# Patient Record
Sex: FEMALE | Race: WHITE | NOT HISPANIC OR LATINO | Employment: FULL TIME | RURAL
[De-identification: names, ages, dates, MRNs, and addresses within clinical notes are randomized per-mention and may not be internally consistent; named-entity substitution may affect disease eponyms.]

---

## 2019-11-01 ENCOUNTER — OFFICE VISIT (OUTPATIENT)
Dept: FAMILY MEDICINE CLINIC | Facility: CLINIC | Age: 24
End: 2019-11-01

## 2019-11-01 VITALS
HEART RATE: 90 BPM | DIASTOLIC BLOOD PRESSURE: 74 MMHG | HEIGHT: 66 IN | WEIGHT: 248.8 LBS | TEMPERATURE: 98.6 F | BODY MASS INDEX: 39.98 KG/M2 | RESPIRATION RATE: 16 BRPM | SYSTOLIC BLOOD PRESSURE: 112 MMHG | OXYGEN SATURATION: 98 %

## 2019-11-01 DIAGNOSIS — Z30.011 ENCOUNTER FOR INITIAL PRESCRIPTION OF CONTRACEPTIVE PILLS: ICD-10-CM

## 2019-11-01 PROCEDURE — 99203 OFFICE O/P NEW LOW 30 MIN: CPT | Performed by: FAMILY MEDICINE

## 2019-11-01 RX ORDER — NORGESTIMATE AND ETHINYL ESTRADIOL 0.25-0.035
1 KIT ORAL DAILY
Qty: 28 TABLET | Refills: 5 | Status: SHIPPED | OUTPATIENT
Start: 2019-11-01 | End: 2020-03-23 | Stop reason: SDUPTHER

## 2019-11-01 RX ORDER — CITALOPRAM 20 MG/1
20 TABLET ORAL DAILY
Qty: 30 TABLET | Refills: 5 | Status: SHIPPED | OUTPATIENT
Start: 2019-11-01 | End: 2020-02-12

## 2019-11-01 NOTE — ASSESSMENT & PLAN NOTE
Take medication as prescribed/ordered.  Common side effects discussed.  Watch for worsening mood or behavior, change in sleep pattern, appetite changes, weight gain, or suicidal thoughts.  Allow 4-6 weeks for medication to reach maximal effectiveness.  F/U in office in 8 weeks to re-evaluate medication, sooner if needed.

## 2019-11-01 NOTE — PROGRESS NOTES
Chief Complaint   Patient presents with   • Depression   • Contraception       Subjective   Louise Lopez is a 23 y.o. female.     Patient states she's noticed depression symptoms since having her daughter 8 weeks ago. She went back to work 2 weeks post partum because her  lost his job.  She also has a 2 year old at home and just has a lot going on.  Depressive symptoms have put a strain on her marriage.  She denies a history of abuse.  She has no prior history of depression.  No depression after first pregnancy.  She reports an uneventful pregnancy and delivery.     Patient also wants to discuss contraception.  She was previously on oral contraceptives and is interested in starting back on them.   She has an OB-GYN who will be doing a pap smear in a few weeks.  She is not currently breast feeding.  Menses were regular but heavy (cramping) prior to pregnancy.  She has not been sexually active since delivery.    Past medical history reviewed.  She has not recently seen a PCP.      Depression   Visit Type: initial  Onset of symptoms: 1 to 6 months ago (2 months ago)  Progression since onset: gradually worsening  Patient presents with the following symptoms: depressed mood, excessive worry, insomnia, irritability and nervousness/anxiety.  Patient is not experiencing: palpitations, shortness of breath and suicidal ideas.  Frequency of symptoms: most days   Severity: moderate   Risk factors: major life event    Contraception   This is a chronic problem. The current episode started more than 1 year ago. The problem occurs constantly. Associated symptoms include fatigue. Pertinent negatives include no abdominal pain, arthralgias, chest pain, chills, coughing, fever, myalgias, nausea, numbness, rash, sore throat, vomiting or weakness.          The following portions of the patient's history were reviewed and updated as appropriate: allergies, current medications, past family history, past medical history, past  social history, past surgical history and problem list.      Active Ambulatory Problems     Diagnosis Date Noted   • GERD (gastroesophageal reflux disease)    • Anemia affecting pregnancy    • Post partum depression 11/01/2019   • Encounter for initial prescription of contraceptive pills 11/01/2019     Resolved Ambulatory Problems     Diagnosis Date Noted   • No Resolved Ambulatory Problems     Past Medical History:   Diagnosis Date   • Anemia affecting pregnancy    • GERD (gastroesophageal reflux disease)    • History of chicken pox        No current outpatient medications on file prior to visit.     No current facility-administered medications on file prior to visit.        History reviewed. No pertinent surgical history.      Family History   Problem Relation Age of Onset   • Heart disease Father    • Hypertension Father    • Kidney disease Father    • Diabetes Father    • Depression Father    • Anxiety disorder Father    • Alcohol abuse Father          Social History     Tobacco Use   • Smoking status: Never Smoker   • Smokeless tobacco: Never Used   Substance Use Topics   • Alcohol use: Not on file       Review of Systems   Constitutional: Positive for fatigue and irritability. Negative for chills and fever.   HENT: Negative for ear pain, sore throat and trouble swallowing.    Eyes: Negative for double vision and visual disturbance.   Respiratory: Negative for cough and shortness of breath.    Cardiovascular: Negative for chest pain, palpitations and leg swelling.   Gastrointestinal: Negative for abdominal pain, blood in stool, constipation, diarrhea, nausea and vomiting.   Endocrine: Negative for polydipsia and polyuria.   Genitourinary: Negative for breast discharge, breast pain, dysuria, menstrual problem and pelvic pain.   Musculoskeletal: Negative for arthralgias and myalgias.   Skin: Negative for rash.   Neurological: Negative for dizziness, tremors, seizures, syncope, weakness, numbness and headache.  "  Psychiatric/Behavioral: Positive for sleep disturbance, depressed mood and stress. Negative for behavioral problems and suicidal ideas. The patient is nervous/anxious and has insomnia.        Objective   Vitals:    11/01/19 1502   BP: 112/74   Pulse: 90   Resp: 16   Temp: 98.6 °F (37 °C)   SpO2: 98%   Weight: 113 kg (248 lb 12.8 oz)   Height: 167.6 cm (66\")     Body mass index is 40.16 kg/m².     Physical Exam   Constitutional: She is oriented to person, place, and time. She appears well-developed and well-nourished.   HENT:   Head: Normocephalic and atraumatic.   Mouth/Throat: Oropharynx is clear and moist.   Eyes: Conjunctivae and EOM are normal. Pupils are equal, round, and reactive to light. No scleral icterus.   Neck: Normal range of motion. Neck supple. No JVD present. No edema present. No thyromegaly present.   Cardiovascular: Normal rate, regular rhythm and normal heart sounds.   Pulmonary/Chest: Effort normal and breath sounds normal.   Abdominal: Soft. Bowel sounds are normal. There is no tenderness. There is no rebound and no guarding.   Musculoskeletal: She exhibits no edema.   Lymphadenopathy:     She has no cervical adenopathy.   Neurological: She is alert and oriented to person, place, and time. No cranial nerve deficit. She exhibits normal muscle tone. Coordination normal.   Skin: Skin is warm. Capillary refill takes less than 2 seconds. No rash noted.   Psychiatric: She has a normal mood and affect. Her behavior is normal. Judgment and thought content normal.     Patient unable to void and provide sample for urine b-HCG.  Unlikely to be pregnant, as no sexual activity since delivery.      Assessment/Plan       Diagnoses and all orders for this visit:    1. Post partum depression (Primary)  Assessment & Plan:  Take medication as prescribed/ordered.  Common side effects discussed.  Watch for worsening mood or behavior, change in sleep pattern, appetite changes, weight gain, or suicidal thoughts.  " Allow 4-6 weeks for medication to reach maximal effectiveness.  F/U in office in 8 weeks to re-evaluate medication, sooner if needed.      Orders:  -     citalopram (CeleXA) 20 MG tablet; Take 1 tablet by mouth Daily.  Dispense: 30 tablet; Refill: 5    2. Encounter for initial prescription of contraceptive pills  Assessment & Plan:  Start OCP.    Orders:  -     norgestimate-ethinyl estradiol (SPRINTEC 28) 0.25-35 MG-MCG per tablet; Take 1 tablet by mouth Daily.  Dispense: 28 tablet; Refill: 5        Return in about 2 months (around 1/1/2020).

## 2019-11-04 ENCOUNTER — TELEPHONE (OUTPATIENT)
Dept: FAMILY MEDICINE CLINIC | Facility: CLINIC | Age: 24
End: 2019-11-04

## 2020-01-03 ENCOUNTER — TELEPHONE (OUTPATIENT)
Dept: FAMILY MEDICINE CLINIC | Facility: CLINIC | Age: 25
End: 2020-01-03

## 2020-02-12 ENCOUNTER — OFFICE VISIT (OUTPATIENT)
Dept: FAMILY MEDICINE CLINIC | Facility: CLINIC | Age: 25
End: 2020-02-12

## 2020-02-12 VITALS
BODY MASS INDEX: 39.63 KG/M2 | DIASTOLIC BLOOD PRESSURE: 64 MMHG | WEIGHT: 246.6 LBS | SYSTOLIC BLOOD PRESSURE: 114 MMHG | TEMPERATURE: 98.7 F | HEIGHT: 66 IN | HEART RATE: 82 BPM | OXYGEN SATURATION: 98 % | RESPIRATION RATE: 16 BRPM

## 2020-02-12 DIAGNOSIS — N30.00 ACUTE CYSTITIS WITHOUT HEMATURIA: Primary | ICD-10-CM

## 2020-02-12 DIAGNOSIS — E66.01 MORBID OBESITY (HCC): ICD-10-CM

## 2020-02-12 LAB
BILIRUB BLD-MCNC: NEGATIVE MG/DL
CLARITY, POC: ABNORMAL
COLOR UR: ABNORMAL
GLUCOSE UR STRIP-MCNC: NEGATIVE MG/DL
KETONES UR QL: NEGATIVE
LEUKOCYTE EST, POC: ABNORMAL
NITRITE UR-MCNC: NEGATIVE MG/ML
PH UR: 5 [PH] (ref 5–8)
PROT UR STRIP-MCNC: ABNORMAL MG/DL
RBC # UR STRIP: ABNORMAL /UL
SP GR UR: 1.02 (ref 1–1.03)
UROBILINOGEN UR QL: NORMAL

## 2020-02-12 PROCEDURE — 99214 OFFICE O/P EST MOD 30 MIN: CPT | Performed by: FAMILY MEDICINE

## 2020-02-12 RX ORDER — ORLISTAT 120 MG/1
120 CAPSULE ORAL
Qty: 90 CAPSULE | Refills: 2 | Status: SHIPPED | OUTPATIENT
Start: 2020-02-12 | End: 2020-02-26

## 2020-02-12 RX ORDER — SULFAMETHOXAZOLE AND TRIMETHOPRIM 800; 160 MG/1; MG/1
1 TABLET ORAL 2 TIMES DAILY
Qty: 14 TABLET | Refills: 0 | Status: SHIPPED | OUTPATIENT
Start: 2020-02-12 | End: 2020-02-19

## 2020-02-12 NOTE — PROGRESS NOTES
Chief Complaint   Patient presents with   • Urinary Tract Infection   • Weight Gain       Subjective   Louise Lopez is a 24 y.o. female.     Urinary Tract Infection    This is a new problem. The current episode started 1 to 4 weeks ago. The problem occurs every urination. The problem has been gradually worsening. The quality of the pain is described as burning. The pain is at a severity of 5/10 (to 10). The pain is mild (to severe). There has been no fever. She is sexually active. There is no history of pyelonephritis. Associated symptoms include frequency and urgency. Pertinent negatives include no chills, flank pain, hematuria, nausea or vomiting. She has tried nothing for the symptoms.      Patient is here to follow-up today on a visit with an urgent care in [place].  She was seen for the symptoms.  She was given medications.  She does not recall which medicine she took.  She states they called her a couple days later needed to change the medicines but she was not able to get these prescriptions.  Her current symptoms are progressively worsening.  She is having a lot of discomfort in her lower pelvis.  She is not having any bowel changes, constipation, diarrhea, or blood in her stools.  There is been no blood in her urine.  She denies any vaginal discharge.  She has no known history of STDs.    She was last seen in this office in November for postpartum depression.  She was prescribed citalopram.  She took this medicine briefly but developed headaches.  She discontinued the medication and did not restart.  She reports her symptoms are better.  She is having less mood fluctuations.  She is not interested in medications at this time.    She also reports that she is trying to diet and exercise without notable weight loss.  She feels she is gaining weight.  Weight difference from her last visit in our office is 2 pounds loss.  She is concerned that her birth control pill may be a factor.  She is considering a  tubal.  She would like to start weight loss medications to help her lose weight.  She is interested in orlistat.    I have reviewed and updated her medications, medical history and problem list during today's office visit.       Past Medical History :  Active Ambulatory Problems     Diagnosis Date Noted   • GERD (gastroesophageal reflux disease)    • Anemia affecting pregnancy    • Encounter for initial prescription of contraceptive pills 11/01/2019     Resolved Ambulatory Problems     Diagnosis Date Noted   • Post partum depression 11/01/2019     Past Medical History:   Diagnosis Date   • History of chicken pox        Medication List:    Current Outpatient Medications:   •  norgestimate-ethinyl estradiol (SPRINTEC 28) 0.25-35 MG-MCG per tablet, Take 1 tablet by mouth Daily., Disp: 28 tablet, Rfl: 5      Social History     Tobacco Use   • Smoking status: Never Smoker   • Smokeless tobacco: Never Used   Substance Use Topics   • Alcohol use: Not on file       Review of Systems   Constitutional: Negative for activity change, appetite change, chills and fever.        See HPI   HENT: Negative for sore throat.    Eyes: Negative for visual disturbance.   Respiratory: Negative for cough, shortness of breath and wheezing.    Cardiovascular: Negative for chest pain and leg swelling.   Gastrointestinal: Negative for abdominal pain, blood in stool, constipation, diarrhea, nausea and vomiting.   Endocrine: Negative for polydipsia.   Genitourinary: Positive for decreased urine volume (Hesitancy), difficulty urinating, dysuria, frequency, pelvic pressure and urgency. Negative for amenorrhea, flank pain, genital sores, hematuria, menstrual problem (Menses regular), urinary incontinence, vaginal bleeding and vaginal discharge.   Musculoskeletal: Negative for myalgias.   Skin: Negative for rash.   Neurological: Negative for dizziness, weakness and headache (Headaches with citalopram now resolved.).   Psychiatric/Behavioral: Negative  "for suicidal ideas, depressed mood and stress. The patient is not nervous/anxious.        I have reviewed and confirmed the accuracy of the ROS as documented by the MA/LPN/RN Inocencia Salas MD      Objective   Vitals:    02/12/20 1304   BP: 114/64   BP Location: Left arm   Patient Position: Sitting   Cuff Size: Large Adult   Pulse: 82   Resp: 16   Temp: 98.7 °F (37.1 °C)   TempSrc: Oral   SpO2: 98%   Weight: 112 kg (246 lb 9.6 oz)   Height: 167.6 cm (66\")     Body mass index is 39.8 kg/m².    Physical Exam   Constitutional: She appears well-developed and well-nourished.   HENT:   Head: Normocephalic and atraumatic.   Mouth/Throat: Oropharynx is clear and moist.   Eyes: Conjunctivae and EOM are normal. No scleral icterus.   Neck: Normal range of motion. Neck supple. No edema present.   Cardiovascular: Normal rate, regular rhythm and normal heart sounds.   Pulmonary/Chest: Effort normal and breath sounds normal.   Abdominal: Soft. Bowel sounds are normal. She exhibits no distension. There is tenderness in the right lower quadrant, suprapubic area and left lower quadrant. There is no rigidity, no rebound, no guarding and no CVA tenderness.   Musculoskeletal: She exhibits no edema.   Neurological: She is alert. No cranial nerve deficit.   Skin: Skin is warm. Capillary refill takes less than 2 seconds. No rash noted.   Psychiatric: She has a normal mood and affect. Her behavior is normal. Judgment and thought content normal.       Brief Urine Lab Results  (Last result in the past 365 days)      Color   Clarity   Blood   Leuk Est   Nitrite   Protein   CREAT   Urine HCG        02/12/20 1343 Dark Yellow Cloudy 50 Juan/ul Moderate (2+) Negative Trace                 Assessment/Plan     Diagnoses and all orders for this visit:    1. Acute cystitis without hematuria (Primary)  Comments:  Treat for UTI based on urinalysis.  Await culture.  She was given a list of foods to avoid that may be a factor in her " symptoms.  Orders:  -     POCT urinalysis dipstick, manual  -     sulfamethoxazole-trimethoprim (BACTRIM DS) 800-160 MG per tablet; Take 1 tablet by mouth 2 (Two) Times a Day for 7 days.  Dispense: 14 tablet; Refill: 0  -     Urine Culture - Urine, Urine, Random Void    2. Post partum depression  Comments:  She reports symptoms are now resolved.  Medication list updated.    3. Morbid obesity (CMS/HCC)  Comments:  She will start orlistat.  Low-fat diet recommended.  Continue exercise.  Fat soluble vitamin supplementation recommended if starting orlistat.  Orders:  -     orlistat (XENICAL) 120 MG capsule; Take 1 capsule by mouth 3 (Three) Times a Day With Meals. Take within one hour of eating a low fat diet.  Dispense: 90 capsule; Refill: 2        Return if symptoms worsen or fail to improve.

## 2020-02-12 NOTE — PATIENT INSTRUCTIONS
Eating Plan for Interstitial Cystitis  Interstitial cystitis (IC) is a long-term (chronic) condition that causes pain and pressure in the bladder, the lower abdomen, and the pelvic area. Other symptoms of IC include urinary urgency and frequency. Symptoms tend to come and go.  Many people with IC find that certain foods trigger their symptoms. Different foods may be problematic for different people. Some foods are more likely to cause symptoms than others. Learning which foods bother you and which do not can help you come up with an eating plan to manage IC.  What are tips for following this plan?  You may find it helpful to work with a dietitian. This health care provider can help you develop your eating plan by doing an elimination diet, which involves these steps:  · Start with a list of foods that you think trigger your IC symptoms along with the foods that most commonly trigger symptoms for many people with IC.  · Eliminate those foods from your diet for about one month, then start reintroducing the foods one at a time to see which ones trigger your symptoms.  · Make a list of the foods that trigger your symptoms. It may take several months to find out which foods bother you.  Reading food labels  Once you know which foods trigger your IC symptoms, you can avoid them. However, it is also a good idea to read food labels because some foods that trigger your symptoms may be included as ingredients in other foods. These ingredients may include:  · Chili peppers.  · Tomato products.  · Soy.  · Worcestershire sauce.  · Vinegar.  · Alcohol.  · Citrus flavors or juices.  · Artificial sweeteners.  · Monosodium glutamate.  Shopping  · Shopping can be a challenge if many foods trigger your IC. When you go grocery shopping, bring a list of the foods you can eat.  · You can get an melia for your phone that lets you know which foods are the safest and which you may want to avoid. You can find the melia at the Interstitial  Cystitis [employer] website: www.ic-network.com  Meal planning  · Plan your meals according to the results of your elimination diet. If you have not done an elimination diet, plan meals according to IC food lists recommended by your health care provider or dietitian. These lists tell you which foods are least and most likely to cause symptoms.  · Avoid certain types of food when you go out to eat, such as pizza and foods typically served at , [place], and [place] restaurants. These foods often contain ingredients that can aggravate IC.  General information  Here are some general guidelines for an IC eating plan:  · Do not eat large portions.  · Drink plenty of fluids with your meals.  · Do not eat foods that are high in sugar, salt, or saturated fat.  · Choose whole fruits instead of juice.  · Eat a colorful variety of vegetables.  What foods should I eat?  For people with IC, the best diet is a balanced one that includes things from all the food groups. Even if you have to avoid certain foods, there are still plenty of healthy choices in each group. The following are some foods that are least bothersome and may be safest to eat:  Fruits  Bananas. Blueberries and blueberry juice. Melons. Pears. Apples. Dates. Prunes. Raisins. Apricots.  Vegetables  Asparagus. Avocado. Celery. Beets. Bell peppers. Black olives. Broccoli. Alpine sprouts. Cabbage. Carrots. Cauliflower. Cucumber. Eggplant. Green beans. Potatoes. Radishes. Spinach. Squash. Turnips. Zucchini. Mushrooms. Peas.  Grains  Oats. Rice. Bran. Oatmeal. Whole wheat bread.  Meats and other proteins  Beef. Fish and other seafood. Eggs. Nuts. Peanut butter. Pork. Poultry. Lamb. Garbanzo beans. Rollins beans.  Dairy  Whole or low-fat milk. American, mozzarella, mild cheddar, feta, ricotta, and cream cheeses.  The items listed above may not be a complete list of foods and beverages you can eat. Contact a dietitian for more information.  What foods should I avoid?  You  should avoid any foods that seem to trigger your symptoms. It is also a good idea to avoid foods that are most likely to cause symptoms in many people with IC. These include the following:  Fruits  Citrus fruits, including stacie, limes, oranges, and grapefruit. Cranberries. Strawberries. Pineapple. Kiwi.  Vegetables  Chili peppers. Onions. Sauerkraut. Tomato and tomato products. Pickles.  Grains  You do not need to avoid any type of grain unless it triggers your symptoms.  Meats and other proteins  Precooked or cured meats, such as sausages or meat loaves. Soy products.  Dairy  Chocolate ice cream. Processed cheese. Yogurt.  Beverages  Alcohol. Chocolate drinks. Coffee. Cranberry juice. Carbonated drinks. Tea (black, green, or herbal). Tomato juice. Sports drinks.  The items listed above may not be a complete list of foods and beverages you should avoid. Contact a dietitian for more information.  Summary  · Many people with IC find that certain foods trigger their symptoms. Different foods may be problematic for different people. Some foods are more likely to cause symptoms than others.  · You may find it helpful to work with a dietitian to do an elimination diet and come up with an eating plan that is right for you.  · Plan your meals according to the results of your elimination diet. If you have not done an elimination diet, plan your meals using IC food lists. These lists tell you which foods are least and most likely to cause symptoms.  · The best diet for people with IC is a balanced diet that includes foods from all the food groups. Even if you have to avoid certain foods, there are still plenty of healthy choices in each group.  This information is not intended to replace advice given to you by your health care provider. Make sure you discuss any questions you have with your health care provider.  Document Released: 08/22/2019 Document Revised: 08/22/2019 Document Reviewed: 08/22/2019  [employer]  Patient Education © 2019 [employer]  Interstitial Cystitis    Interstitial cystitis is inflammation of the bladder. This may cause pain in the bladder area as well as a frequent and urgent need to urinate. The bladder is a hollow organ in the lower part of the abdomen. It stores urine after the urine is made in the kidneys.  The severity of interstitial cystitis can vary from person to person. You may have flare-ups, and then your symptoms may go away for a while. For many people, it becomes a long-term (chronic) problem.  What are the causes?  The cause of this condition is not known.  What increases the risk?  The following factors may make you more likely to develop this condition:  · You are female.  · You have fibromyalgia.  · You have irritable bowel syndrome (IBS).  · You have endometriosis.  This condition may be aggravated by:  · Stress.  · Smoking.  · Spicy foods.  What are the signs or symptoms?  Symptoms of interstitial cystitis vary, and they can change over time. Symptoms may include:  · Discomfort or pain in the bladder area, which is in the lower abdomen. Pain can range from mild to severe. The pain may change in intensity as the bladder fills with urine or as it empties.  · Pain in the pelvic area, between the hip bones.  · An urgent need to urinate.  · Frequent urination.  · Pain during urination.  · Pain during sex.  · Blood in the urine.  For women, symptoms often get worse during menstruation.  How is this diagnosed?  This condition is diagnosed based on your symptoms, your medical history, and a physical exam. You may have tests to rule out other conditions, such as:  · Urine tests.  · Cystoscopy. For this test, a tool similar to a very thin telescope is used to look into your bladder.  · Biopsy. This involves taking a sample of tissue from the bladder to be examined under a microscope.  How is this treated?  There is no cure for this condition, but treatment can help you control your  symptoms. Work closely with your health care provider to find the most effective treatments for you. Treatment options may include:  · Medicines to relieve pain and reduce how often you feel the need to urinate.  · Learning ways to control when you urinate (bladder training).  · Lifestyle changes, such as changing your diet or taking steps to control stress.  · Using a device that provides electrical stimulation to your nerves, which can relieve pain (neuromodulation therapy). The device is placed on your back, where it blocks the nerves that cause you to feel pain in your bladder area.  · A procedure that stretches your bladder by filling it with air or fluid.  · Surgery. This is rare. It is only done for extreme cases, if other treatments do not help.  Follow these instructions at home:  Bladder training    · Use bladder training techniques as directed. Techniques may include:  ? Urinating at scheduled times.  ? Training yourself to delay urination.  ? Doing exercises (Kegel exercises) to strengthen the muscles that control urine flow.  · Keep a bladder diary.  ? Write down the times that you urinate and any symptoms that you have. This can help you find out which foods, liquids, or activities make your symptoms worse.  ? Use your bladder diary to schedule bathroom trips. If you are away from home, plan to be near a bathroom at each of your scheduled times.  · Make sure that you urinate just before you leave the house and just before you go to bed.  Eating and drinking  · Make dietary changes as recommended by your health care provider. You may need to avoid:  ? Spicy foods.  ? Foods that contain a lot of potassium.  · Limit your intake of beverages that make you need to urinate. These include:  ? Caffeinated beverages like soda, coffee, and tea.  ? Alcohol.  General instructions  · Take over-the-counter and prescription medicines only as told by your health care provider.  · Do not drink alcohol.  · You can try a  warm or cool compress over your bladder for comfort.  · Avoid wearing tight clothing.  · Do not use any products that contain nicotine or tobacco, such as cigarettes and e-cigarettes. If you need help quitting, ask your health care provider.  · Keep all follow-up visits as told by your health care provider. This is important.  Contact a health care provider if you have:  · Symptoms that do not get better with treatment.  · Pain or discomfort that gets worse.  · More frequent urges to urinate.  · A fever.  Get help right away if:  · You have no control over when you urinate.  Summary  · Interstitial cystitis is inflammation of the bladder.  · This condition may cause pain in the bladder area as well as a frequent and urgent need to urinate.  · You may have flare-ups of the condition, and then it may go away for a while. For many people, it becomes a long-term (chronic) problem.  · There is no cure for interstitial cystitis, but treatment methods are available to control your symptoms.  This information is not intended to replace advice given to you by your health care provider. Make sure you discuss any questions you have with your health care provider.  Document Released: 08/18/2005 Document Revised: 11/12/2018 Document Reviewed: 11/12/2018  [employer] Interactive Patient Education © 2019 [employer]

## 2020-02-14 LAB
BACTERIA UR CULT: NORMAL
BACTERIA UR CULT: NORMAL

## 2020-02-24 ENCOUNTER — TELEPHONE (OUTPATIENT)
Dept: FAMILY MEDICINE CLINIC | Facility: CLINIC | Age: 25
End: 2020-02-24

## 2020-02-24 NOTE — TELEPHONE ENCOUNTER
----- Message from Inocencia Salas MD sent at 2/14/2020  3:47 PM EST -----  Please let patient know that her urine culture was negative for infection.  Thanks.

## 2020-02-26 ENCOUNTER — OFFICE VISIT (OUTPATIENT)
Dept: FAMILY MEDICINE CLINIC | Facility: CLINIC | Age: 25
End: 2020-02-26

## 2020-02-26 VITALS
DIASTOLIC BLOOD PRESSURE: 84 MMHG | WEIGHT: 247 LBS | SYSTOLIC BLOOD PRESSURE: 130 MMHG | OXYGEN SATURATION: 98 % | TEMPERATURE: 98.4 F | HEART RATE: 85 BPM | BODY MASS INDEX: 39.7 KG/M2 | HEIGHT: 66 IN

## 2020-02-26 DIAGNOSIS — R11.0 NAUSEA: ICD-10-CM

## 2020-02-26 DIAGNOSIS — R51.9 ACUTE INTRACTABLE HEADACHE, UNSPECIFIED HEADACHE TYPE: Primary | ICD-10-CM

## 2020-02-26 PROCEDURE — 99213 OFFICE O/P EST LOW 20 MIN: CPT | Performed by: FAMILY MEDICINE

## 2020-02-26 PROCEDURE — 96372 THER/PROPH/DIAG INJ SC/IM: CPT | Performed by: FAMILY MEDICINE

## 2020-02-26 RX ORDER — PROMETHAZINE HYDROCHLORIDE 25 MG/1
25 TABLET ORAL EVERY 6 HOURS PRN
Qty: 20 TABLET | Refills: 0 | Status: SHIPPED | OUTPATIENT
Start: 2020-02-26 | End: 2020-03-23 | Stop reason: SDUPTHER

## 2020-02-26 RX ORDER — PROMETHAZINE HYDROCHLORIDE 25 MG/ML
25 INJECTION, SOLUTION INTRAMUSCULAR; INTRAVENOUS ONCE
Status: COMPLETED | OUTPATIENT
Start: 2020-02-26 | End: 2020-02-26

## 2020-02-26 RX ORDER — SUMATRIPTAN 50 MG/1
100 TABLET, FILM COATED ORAL ONCE AS NEEDED
Qty: 9 TABLET | Refills: 0 | Status: SHIPPED | OUTPATIENT
Start: 2020-02-26 | End: 2020-03-26 | Stop reason: SDUPTHER

## 2020-02-26 RX ORDER — KETOROLAC TROMETHAMINE 30 MG/ML
60 INJECTION, SOLUTION INTRAMUSCULAR; INTRAVENOUS ONCE
Status: COMPLETED | OUTPATIENT
Start: 2020-02-26 | End: 2020-02-26

## 2020-02-26 RX ADMIN — KETOROLAC TROMETHAMINE 60 MG: 30 INJECTION, SOLUTION INTRAMUSCULAR; INTRAVENOUS at 14:59

## 2020-02-26 RX ADMIN — PROMETHAZINE HYDROCHLORIDE 25 MG: 25 INJECTION, SOLUTION INTRAMUSCULAR; INTRAVENOUS at 14:55

## 2020-02-26 NOTE — PROGRESS NOTES
Chief Complaint   Patient presents with   • Headache     X6 days        Subjective   Louise Lopez is a 24 y.o. female.     Headache    This is a new problem. The current episode started in the past 7 days. The problem occurs constantly. The problem has been gradually worsening. The pain is located in the frontal region. The pain quality is not similar to prior headaches. The quality of the pain is described as throbbing. The pain is at a severity of 8/10. The pain is moderate. Associated symptoms include blurred vision (Difficulty focusing with bright light), nausea, neck pain and photophobia. Pertinent negatives include no abdominal pain, back pain, dizziness, ear pain, eye redness, fever, hearing loss, numbness, seizures, sinus pressure, sore throat, swollen glands, tinnitus, vomiting or weakness. The symptoms are aggravated by bright light. She has tried acetaminophen and NSAIDs for the symptoms. The treatment provided mild relief.      Patient reports history of migraines in the past.  Migraines have not been quite as severe as her current migraine.  Headache symptoms are not associated with neurological deficit.  She is having some posterior neck pain but no stiffness.  She does not have known history of menstrual migraines or migraines triggered by use of contraceptive pills.  She is not currently pregnant.  There is been a no major change in her diet or activities.  Last dose of ibuprofen was last night.      I have reviewed and updated her medications, medical history and problem list during today's office visit.       Past Medical History :  Active Ambulatory Problems     Diagnosis Date Noted   • GERD (gastroesophageal reflux disease)    • Anemia affecting pregnancy    • Encounter for initial prescription of contraceptive pills 11/01/2019     Resolved Ambulatory Problems     Diagnosis Date Noted   • Post partum depression 11/01/2019     Past Medical History:   Diagnosis Date   • History of chicken pox   "      Medication List:    Current Outpatient Medications:   •  norgestimate-ethinyl estradiol (SPRINTEC 28) 0.25-35 MG-MCG per tablet, Take 1 tablet by mouth Daily., Disp: 28 tablet, Rfl: 5    Social History     Tobacco Use   • Smoking status: Never Smoker   • Smokeless tobacco: Never Used   Substance Use Topics   • Alcohol use: Not on file       Review of Systems   Constitutional: Negative for activity change, appetite change, chills and fever.   HENT: Negative for congestion, dental problem, ear discharge, ear pain, facial swelling, hearing loss, nosebleeds, sinus pressure, sore throat, swollen glands, tinnitus and trouble swallowing.    Eyes: Positive for blurred vision (Difficulty focusing with bright light) and photophobia. Negative for double vision, discharge, redness and itching.   Respiratory: Negative for chest tightness and shortness of breath.    Cardiovascular: Positive for chest pain (Yesterday, now resolved).   Gastrointestinal: Positive for nausea. Negative for abdominal pain and vomiting.   Musculoskeletal: Positive for myalgias (Bilateral arms, resolved today) and neck pain. Negative for back pain, gait problem and neck stiffness.   Skin: Negative for rash and bruise.   Neurological: Positive for headache. Negative for dizziness, tremors, seizures, syncope, facial asymmetry, speech difficulty, weakness, light-headedness, numbness, memory problem and confusion.   Psychiatric/Behavioral: Negative for sleep disturbance, suicidal ideas, depressed mood and stress.       I have reviewed and confirmed the accuracy of the ROS as documented by the MA/LPN/RN Inocencia Salas MD      Objective   Vitals:    02/26/20 1415   BP: 130/84   BP Location: Left arm   Patient Position: Sitting   Cuff Size: Adult   Pulse: 85   Temp: 98.4 °F (36.9 °C)   TempSrc: Oral   SpO2: 98%   Weight: 112 kg (247 lb)   Height: 167.6 cm (66\")     Body mass index is 39.87 kg/m².    Physical Exam   Constitutional: She is " oriented to person, place, and time. She appears well-developed and well-nourished. No distress.   HENT:   Head: Normocephalic and atraumatic.   Right Ear: External ear normal.   Left Ear: External ear normal.   Mouth/Throat: Oropharynx is clear and moist.   Eyes: Pupils are equal, round, and reactive to light. Conjunctivae and EOM are normal. Right eye exhibits no discharge. Left eye exhibits no discharge. No scleral icterus.   Neck: Normal range of motion. Neck supple. Muscular tenderness present. No spinous process tenderness present. No neck rigidity. No edema, no erythema and normal range of motion present.       Cardiovascular: Normal rate, regular rhythm and normal heart sounds.   No murmur heard.  Pulmonary/Chest: Effort normal and breath sounds normal.   Abdominal: Soft.   Musculoskeletal: She exhibits no edema.   Neurological: She is alert and oriented to person, place, and time. No cranial nerve deficit.   Skin: Skin is warm. Capillary refill takes less than 2 seconds. No rash noted.   Psychiatric: She has a normal mood and affect. Her behavior is normal. Thought content normal.         Assessment/Plan     Diagnoses and all orders for this visit:    1. Acute intractable headache, unspecified headache type (Primary)  -     SUMAtriptan (IMITREX) 50 MG tablet; Take 2 tablets by mouth 1 (One) Time As Needed for Migraine (Take one at onset of symptoms. May repeat in two hours (Maximum two per day).).  Dispense: 9 tablet; Refill: 0  -     ketorolac (TORADOL) injection 60 mg  -     promethazine (PHENERGAN) injection 25 mg    2. Nausea  -     promethazine (PHENERGAN) 25 MG tablet; Take 1 tablet by mouth Every 6 (Six) Hours As Needed for Nausea or Vomiting.  Dispense: 20 tablet; Refill: 0  -     promethazine (PHENERGAN) injection 25 mg    Will treat in office with IM Toradol and Phenergan.  She does have a  with her today.  Avoid further use of NSAIDs tonight.  Prescription for sumatriptan given.  Dosing  instructions given.  Follow-up as needed.    Return if symptoms worsen or fail to improve.

## 2020-03-23 ENCOUNTER — OFFICE VISIT (OUTPATIENT)
Dept: FAMILY MEDICINE CLINIC | Facility: CLINIC | Age: 25
End: 2020-03-23

## 2020-03-23 VITALS
HEIGHT: 66 IN | DIASTOLIC BLOOD PRESSURE: 120 MMHG | SYSTOLIC BLOOD PRESSURE: 165 MMHG | TEMPERATURE: 98 F | HEART RATE: 83 BPM | BODY MASS INDEX: 39.63 KG/M2 | WEIGHT: 246.6 LBS | OXYGEN SATURATION: 99 %

## 2020-03-23 DIAGNOSIS — Z30.011 ENCOUNTER FOR INITIAL PRESCRIPTION OF CONTRACEPTIVE PILLS: ICD-10-CM

## 2020-03-23 DIAGNOSIS — R11.0 NAUSEA: ICD-10-CM

## 2020-03-23 DIAGNOSIS — R03.0 ELEVATED BLOOD PRESSURE, SITUATIONAL: ICD-10-CM

## 2020-03-23 DIAGNOSIS — F41.9 ANXIETY: ICD-10-CM

## 2020-03-23 DIAGNOSIS — R10.13 EPIGASTRIC PAIN: Primary | ICD-10-CM

## 2020-03-23 PROCEDURE — 99214 OFFICE O/P EST MOD 30 MIN: CPT | Performed by: FAMILY MEDICINE

## 2020-03-23 PROCEDURE — 93000 ELECTROCARDIOGRAM COMPLETE: CPT | Performed by: FAMILY MEDICINE

## 2020-03-23 RX ORDER — PROMETHAZINE HYDROCHLORIDE 25 MG/1
25 TABLET ORAL EVERY 6 HOURS PRN
Qty: 30 TABLET | Refills: 0 | Status: SHIPPED | OUTPATIENT
Start: 2020-03-23 | End: 2022-01-03

## 2020-03-23 RX ORDER — NORGESTIMATE AND ETHINYL ESTRADIOL 0.25-0.035
1 KIT ORAL DAILY
Qty: 28 TABLET | Refills: 5 | Status: SHIPPED | OUTPATIENT
Start: 2020-03-23 | End: 2020-07-02

## 2020-03-23 RX ORDER — BUSPIRONE HYDROCHLORIDE 5 MG/1
5 TABLET ORAL 3 TIMES DAILY
Qty: 90 TABLET | Refills: 2 | Status: SHIPPED | OUTPATIENT
Start: 2020-03-23 | End: 2020-06-28 | Stop reason: SDUPTHER

## 2020-03-23 RX ORDER — CITALOPRAM 20 MG/1
20 TABLET ORAL DAILY
Qty: 30 TABLET | Refills: 5
Start: 2020-03-23 | End: 2020-07-02

## 2020-03-26 ENCOUNTER — TELEPHONE (OUTPATIENT)
Dept: FAMILY MEDICINE CLINIC | Facility: CLINIC | Age: 25
End: 2020-03-26

## 2020-03-26 DIAGNOSIS — R51.9 ACUTE INTRACTABLE HEADACHE, UNSPECIFIED HEADACHE TYPE: ICD-10-CM

## 2020-03-26 DIAGNOSIS — I10 ESSENTIAL HYPERTENSION: Primary | ICD-10-CM

## 2020-03-26 RX ORDER — HYDROCHLOROTHIAZIDE 25 MG/1
25 TABLET ORAL DAILY
Qty: 30 TABLET | Refills: 5 | Status: SHIPPED | OUTPATIENT
Start: 2020-03-26 | End: 2020-09-11 | Stop reason: SDUPTHER

## 2020-03-26 RX ORDER — SUMATRIPTAN 50 MG/1
100 TABLET, FILM COATED ORAL ONCE AS NEEDED
Qty: 9 TABLET | Refills: 5 | Status: SHIPPED | OUTPATIENT
Start: 2020-03-26 | End: 2020-04-17 | Stop reason: SDUPTHER

## 2020-03-26 NOTE — TELEPHONE ENCOUNTER
Sent in hydrochlorothiazide.  It is a diuretic, so watch for increased urination.  Take in the AM.  Avoid sunbathing and tanning beds with use.

## 2020-04-17 DIAGNOSIS — R51.9 ACUTE INTRACTABLE HEADACHE, UNSPECIFIED HEADACHE TYPE: ICD-10-CM

## 2020-04-17 RX ORDER — SUMATRIPTAN 50 MG/1
100 TABLET, FILM COATED ORAL ONCE AS NEEDED
Qty: 9 TABLET | Refills: 5 | Status: SHIPPED | OUTPATIENT
Start: 2020-04-17 | End: 2020-06-28 | Stop reason: SDUPTHER

## 2020-06-28 DIAGNOSIS — R51.9 ACUTE INTRACTABLE HEADACHE, UNSPECIFIED HEADACHE TYPE: ICD-10-CM

## 2020-06-28 DIAGNOSIS — F41.9 ANXIETY: ICD-10-CM

## 2020-06-29 DIAGNOSIS — F41.9 ANXIETY: ICD-10-CM

## 2020-06-29 RX ORDER — BUSPIRONE HYDROCHLORIDE 5 MG/1
5 TABLET ORAL 3 TIMES DAILY
Qty: 90 TABLET | Refills: 2 | Status: SHIPPED | OUTPATIENT
Start: 2020-06-29 | End: 2020-06-30

## 2020-06-29 RX ORDER — SUMATRIPTAN 50 MG/1
100 TABLET, FILM COATED ORAL ONCE AS NEEDED
Qty: 9 TABLET | Refills: 5 | Status: SHIPPED | OUTPATIENT
Start: 2020-06-29 | End: 2020-09-24 | Stop reason: SDUPTHER

## 2020-06-30 RX ORDER — BUSPIRONE HYDROCHLORIDE 5 MG/1
TABLET ORAL
Qty: 90 TABLET | Refills: 0 | Status: SHIPPED | OUTPATIENT
Start: 2020-06-30 | End: 2020-07-02 | Stop reason: SDUPTHER

## 2020-07-01 PROBLEM — I10 ESSENTIAL HYPERTENSION: Status: ACTIVE | Noted: 2020-07-01

## 2020-07-01 PROBLEM — F41.9 ANXIETY: Status: ACTIVE | Noted: 2020-07-01

## 2020-07-01 PROBLEM — R51.9 RECURRENT HEADACHE: Status: ACTIVE | Noted: 2020-07-01

## 2020-07-02 ENCOUNTER — OFFICE VISIT (OUTPATIENT)
Dept: FAMILY MEDICINE CLINIC | Facility: CLINIC | Age: 25
End: 2020-07-02

## 2020-07-02 VITALS
HEIGHT: 66 IN | RESPIRATION RATE: 18 BRPM | WEIGHT: 235.2 LBS | DIASTOLIC BLOOD PRESSURE: 82 MMHG | TEMPERATURE: 97.9 F | OXYGEN SATURATION: 98 % | SYSTOLIC BLOOD PRESSURE: 130 MMHG | BODY MASS INDEX: 37.8 KG/M2 | HEART RATE: 88 BPM

## 2020-07-02 DIAGNOSIS — F43.23 SITUATIONAL MIXED ANXIETY AND DEPRESSIVE DISORDER: ICD-10-CM

## 2020-07-02 DIAGNOSIS — F41.9 ANXIETY: ICD-10-CM

## 2020-07-02 DIAGNOSIS — I10 ESSENTIAL HYPERTENSION: Primary | ICD-10-CM

## 2020-07-02 DIAGNOSIS — R51.9 RECURRENT HEADACHE: ICD-10-CM

## 2020-07-02 PROCEDURE — 99214 OFFICE O/P EST MOD 30 MIN: CPT | Performed by: FAMILY MEDICINE

## 2020-07-02 RX ORDER — BUSPIRONE HYDROCHLORIDE 5 MG/1
5 TABLET ORAL 3 TIMES DAILY
Qty: 90 TABLET | Refills: 5 | Status: SHIPPED | OUTPATIENT
Start: 2020-07-02 | End: 2021-08-02

## 2020-07-02 RX ORDER — VENLAFAXINE HYDROCHLORIDE 37.5 MG/1
37.5 CAPSULE, EXTENDED RELEASE ORAL DAILY
Qty: 30 CAPSULE | Refills: 5 | Status: SHIPPED | OUTPATIENT
Start: 2020-07-02 | End: 2022-01-03

## 2020-07-02 NOTE — PROGRESS NOTES
Chief Complaint   Patient presents with   • Headache   • Hypertension   • Anxiety       Subjective   Louise Lopez is a 24 y.o. female.     Headache    This is a chronic problem. The current episode started more than 1 year ago. The problem occurs daily. The problem has been gradually worsening. The pain is located in the frontal region. The pain radiates to the right neck and left neck. The pain quality is similar to prior headaches. The quality of the pain is described as throbbing, stabbing and sharp. The pain is at a severity of 0/10 (to a 10). The pain is mild (to severe). Associated symptoms include dizziness, eye pain (left), neck pain, numbness (left arm goes numb when BP high (about every other weekend)) and photophobia. Pertinent negatives include no fever, seizures, vomiting or weakness. The symptoms are aggravated by bright light, noise and menstrual cycle. Treatments tried: imitrex. The treatment provided moderate relief. Her past medical history is significant for hypertension, migraine headaches, migraines in the family and obesity.   Hypertension   This is a chronic problem. The current episode started more than 1 year ago. The problem is unchanged. The problem is controlled. Associated symptoms include anxiety, headaches and neck pain. Pertinent negatives include no chest pain or shortness of breath. There are no associated agents to hypertension.   Anxiety   Presents for follow-up visit. Symptoms include depressed mood, dizziness, excessive worry, irritability, nervous/anxious behavior and restlessness. Patient reports no chest pain or shortness of breath. Symptoms occur occasionally. The severity of symptoms is mild (to severe). The patient sleeps 4 hours per night. The quality of sleep is fair. Nighttime awakenings: several.       She takes medications intermittently to help break her headaches.  They help for awhile.  She takes medication about every other day (sumatriptan, Excedrin  Migraine, Ibuprofen).    She has increased stressors.      Answers for HPI/ROS submitted by the patient on 7/2/2020   What is the primary reason for your visit?: Other  Please describe your symptoms.: Aby been getting really bad migraines that my head hurt all the way around. It gets so bad where I cant keep my eyes open long.  Have you had these symptoms before?: Yes  How long have you been having these symptoms?: Greater than 2 weeks  Please list any medications you are currently taking for this condition.: Im taking the prescription medicine that was prescribed from  And Tylenol and ibprophen  Please describe any probable cause for these symptoms. : Stress, heat? I'm not sure.      I have reviewed and updated her medications, medical history and problem list during today's office visit.       Past Medical History :  Active Ambulatory Problems     Diagnosis Date Noted   • GERD (gastroesophageal reflux disease)    • Anemia affecting pregnancy    • Encounter for initial prescription of contraceptive pills 11/01/2019   • Recurrent headache 07/01/2020   • Essential hypertension 07/01/2020   • Anxiety 07/01/2020   • Situational mixed anxiety and depressive disorder 07/02/2020     Resolved Ambulatory Problems     Diagnosis Date Noted   • Post partum depression 11/01/2019     Past Medical History:   Diagnosis Date   • History of chicken pox        Medication List:    Current Outpatient Medications:   •  busPIRone (BUSPAR) 5 MG tablet, TAKE 1 TABLET BY MOUTH THREE TIMES DAILY, Disp: 90 tablet, Rfl: 0  •  citalopram (CeleXA) 20 MG tablet, Take 1 tablet by mouth Daily., Disp: 30 tablet, Rfl: 5  •  hydroCHLOROthiazide (HYDRODIURIL) 25 MG tablet, Take 1 tablet by mouth Daily., Disp: 30 tablet, Rfl: 5  •  norgestimate-ethinyl estradiol (Sprintec 28) 0.25-35 MG-MCG per tablet, Take 1 tablet by mouth Daily., Disp: 28 tablet, Rfl: 5  •  promethazine (PHENERGAN) 25 MG tablet, Take 1 tablet by mouth Every 6 (Six) Hours As Needed  "for Nausea or Vomiting., Disp: 30 tablet, Rfl: 0  •  SUMAtriptan (IMITREX) 50 MG tablet, Take 2 tablets by mouth 1 (One) Time As Needed for Migraine (Take one at onset of symptoms. May repeat in two hours (Maximum two per day).)., Disp: 9 tablet, Rfl: 5      No Known Allergies    Social History     Tobacco Use   • Smoking status: Never Smoker   • Smokeless tobacco: Never Used   Substance Use Topics   • Alcohol use: Not on file       Review of Systems   Constitutional: Positive for irritability. Negative for fever.   Eyes: Positive for photophobia and pain (left).   Respiratory: Negative for shortness of breath.    Cardiovascular: Negative for chest pain.   Gastrointestinal: Negative for diarrhea and vomiting.   Genitourinary: Negative for dysuria and menstrual problem.   Musculoskeletal: Positive for neck pain. Negative for neck stiffness.   Skin: Negative for rash and bruise.   Neurological: Positive for dizziness, numbness (left arm goes numb when BP high (about every other weekend)) and headache. Negative for seizures, syncope, facial asymmetry and weakness.   Psychiatric/Behavioral: Positive for depressed mood. The patient is nervous/anxious.        I have reviewed and confirmed the accuracy of the ROS as documented by the MA/LPN/RN Inocencia Salas MD      Objective   Vitals:    07/02/20 1138   BP: 130/82   BP Location: Right arm   Patient Position: Sitting   Cuff Size: Large Adult   Pulse: 88   Resp: 18   Temp: 97.9 °F (36.6 °C)   TempSrc: Oral   SpO2: 98%   Weight: 107 kg (235 lb 3.2 oz)   Height: 167.6 cm (65.98\")     Body mass index is 37.99 kg/m².    Physical Exam   Constitutional: She is oriented to person, place, and time. She appears well-developed and well-nourished. No distress.   HENT:   Head: Normocephalic and atraumatic.   Mouth/Throat: Oropharynx is clear and moist.   Eyes: Pupils are equal, round, and reactive to light. Conjunctivae and EOM are normal.   Neck: Normal range of motion. " Neck supple. No edema present.   Cardiovascular: Normal rate, regular rhythm and normal heart sounds.   Pulmonary/Chest: Effort normal and breath sounds normal.   Musculoskeletal: She exhibits no edema.   Neurological: She is alert and oriented to person, place, and time. No cranial nerve deficit.   Skin: Skin is warm. Capillary refill takes less than 2 seconds. No rash noted.   Psychiatric: She has a normal mood and affect. Her behavior is normal. Thought content normal.           Assessment/Plan     Diagnoses and all orders for this visit:    1. Essential hypertension (Primary)  Comments:  BP stable today.  She opts to treat depression/anxiety symptoms better and discontinue OCP.    2. Recurrent headache  Comments:  Change to SNRI.  Common medication side effects discussed.  Orders:  -     venlafaxine XR (EFFEXOR-XR) 37.5 MG 24 hr capsule; Take 1 capsule by mouth Daily.  Dispense: 30 capsule; Refill: 5    3. Anxiety  -     venlafaxine XR (EFFEXOR-XR) 37.5 MG 24 hr capsule; Take 1 capsule by mouth Daily.  Dispense: 30 capsule; Refill: 5  -     busPIRone (BUSPAR) 5 MG tablet; Take 1 tablet by mouth 3 (Three) Times a Day.  Dispense: 90 tablet; Refill: 5    4. Situational mixed anxiety and depressive disorder  -     venlafaxine XR (EFFEXOR-XR) 37.5 MG 24 hr capsule; Take 1 capsule by mouth Daily.  Dispense: 30 capsule; Refill: 5    Consider trial of verapamil if symptoms persist.    Return in about 4 weeks (around 7/30/2020) for Recheck.     I wore protective equipment throughout this patient encounter to include mask. Hand hygiene was performed before donning protective equipment and after removal when leaving the room.  Patient also wore a mask.

## 2020-08-10 ENCOUNTER — OFFICE VISIT (OUTPATIENT)
Dept: FAMILY MEDICINE CLINIC | Facility: CLINIC | Age: 25
End: 2020-08-10

## 2020-08-10 VITALS
RESPIRATION RATE: 18 BRPM | HEIGHT: 66 IN | DIASTOLIC BLOOD PRESSURE: 78 MMHG | OXYGEN SATURATION: 98 % | HEART RATE: 85 BPM | TEMPERATURE: 98.8 F | WEIGHT: 246 LBS | SYSTOLIC BLOOD PRESSURE: 130 MMHG | BODY MASS INDEX: 39.53 KG/M2

## 2020-08-10 DIAGNOSIS — K12.2 MOUTH ABSCESS: Primary | ICD-10-CM

## 2020-08-10 PROCEDURE — 99213 OFFICE O/P EST LOW 20 MIN: CPT | Performed by: FAMILY MEDICINE

## 2020-08-10 RX ORDER — CLINDAMYCIN HYDROCHLORIDE 300 MG/1
300 CAPSULE ORAL 3 TIMES DAILY
Qty: 42 CAPSULE | Refills: 2 | Status: SHIPPED | OUTPATIENT
Start: 2020-08-10 | End: 2020-08-24

## 2020-08-10 NOTE — PROGRESS NOTES
Subjective   KATHERINE TENORIO is a 24 y.o. female.     Dentist appt not until late Sept.  Right lower face red, swollen, painful    Dental Pain    The current episode started in the past 7 days (Friday). The problem occurs constantly. The problem has been rapidly worsening. Associated symptoms include facial pain. Pertinent negatives include no fever. Associated symptoms comments: Right side of jaw is swollen.        The following portions of the patient's history were reviewed and updated as appropriate: allergies, current medications, past family history, past medical history, past social history, past surgical history and problem list.    Patient Active Problem List   Diagnosis   • GERD (gastroesophageal reflux disease)   • Anemia affecting pregnancy   • Encounter for initial prescription of contraceptive pills   • Recurrent headache   • Essential hypertension   • Anxiety   • Situational mixed anxiety and depressive disorder       Current Outpatient Medications on File Prior to Visit   Medication Sig Dispense Refill   • busPIRone (BUSPAR) 5 MG tablet Take 1 tablet by mouth 3 (Three) Times a Day. 90 tablet 5   • hydroCHLOROthiazide (HYDRODIURIL) 25 MG tablet Take 1 tablet by mouth Daily. 30 tablet 5   • promethazine (PHENERGAN) 25 MG tablet Take 1 tablet by mouth Every 6 (Six) Hours As Needed for Nausea or Vomiting. 30 tablet 0   • SUMAtriptan (IMITREX) 50 MG tablet Take 2 tablets by mouth 1 (One) Time As Needed for Migraine (Take one at onset of symptoms. May repeat in two hours (Maximum two per day).). 9 tablet 5   • venlafaxine XR (EFFEXOR-XR) 37.5 MG 24 hr capsule Take 1 capsule by mouth Daily. 30 capsule 5     No current facility-administered medications on file prior to visit.      Current outpatient and discharge medications have been reconciled for the patient.  Reviewed by: Abilio Oneill MD      No Known Allergies    Review of Systems   Constitutional: Negative for activity change, appetite change,  fatigue and fever.   HENT: Positive for dental problem and facial swelling. Negative for ear pain, swollen glands and voice change.    Eyes: Negative for visual disturbance.   Respiratory: Negative for shortness of breath and wheezing.    Cardiovascular: Negative for chest pain and leg swelling.   Gastrointestinal: Negative for abdominal pain, blood in stool, constipation, diarrhea, nausea and vomiting.   Endocrine: Negative for polydipsia and polyuria.   Genitourinary: Negative for dysuria, frequency and hematuria.   Musculoskeletal: Negative for joint swelling, neck pain and neck stiffness.   Skin: Negative for rash and bruise.   Neurological: Negative for weakness, numbness and headache.   Psychiatric/Behavioral: Negative for suicidal ideas and depressed mood.     I have reviewed and confirmed the accuracy of the ROS as documented by the MA/LPN/RN Abilio Oneill MD      Objective   Vitals:    08/10/20 1703   BP: 130/78   Pulse: 85   Resp: 18   Temp: 98.8 °F (37.1 °C)   SpO2: 98%     Physical Exam   Constitutional: She is oriented to person, place, and time. She appears well-developed and well-nourished.   HENT:   Head: Normocephalic and atraumatic.   Left Ear: External ear normal.   Nose: Nose normal.   Mouth/Throat: Oropharynx is clear and moist.   Right lower face with erythema, swelling, tenderness   Eyes: Pupils are equal, round, and reactive to light. EOM are normal.   Neck: Normal range of motion. Neck supple.   Cardiovascular: Normal rate, regular rhythm and normal heart sounds.   Pulmonary/Chest: Effort normal.   Abdominal: Soft. Bowel sounds are normal.   Musculoskeletal: Normal range of motion.   Neurological: She is alert and oriented to person, place, and time.   Skin: Skin is warm and dry.   Psychiatric: She has a normal mood and affect. Her behavior is normal. Judgment and thought content normal.       I wore protective equipment throughout this patient encounter to include mask. Hand  hygiene was performed before donning protective equipment and after removal when leaving the room.    Assessment/Plan .  Problem List Items Addressed This Visit     None      Visit Diagnoses     Mouth abscess    -  Primary    Relevant Medications    clindamycin (CLEOCIN) 300 MG capsule      Findings discussed. All questions answered.  Medication and medication adverse effects discussed.  Drug education given and explained to patient. Patient verbalized understanding.  Infection precautions reviewed.  Follow-up in 1 week if not better.  Follow-up sooner for worsening symptoms or for any concerns.            Answers for HPI/ROS submitted by the patient on 8/10/2020   What is the primary reason for your visit?: Other  Please describe your symptoms.: Tooth pain, swollen cheek  Have you had these symptoms before?: Yes  How long have you been having these symptoms?: 1-4 days  Please list any medications you are currently taking for this condition.: Tylenol and ibprophen  Please describe any probable cause for these symptoms. : Bad tooth possible infection. Had this happen before. Called dentist cant get in till late August early september

## 2020-09-11 DIAGNOSIS — I10 ESSENTIAL HYPERTENSION: ICD-10-CM

## 2020-09-11 RX ORDER — HYDROCHLOROTHIAZIDE 25 MG/1
25 TABLET ORAL DAILY
Qty: 30 TABLET | Refills: 5 | Status: SHIPPED | OUTPATIENT
Start: 2020-09-11 | End: 2022-01-03

## 2020-09-24 DIAGNOSIS — R51.9 ACUTE INTRACTABLE HEADACHE, UNSPECIFIED HEADACHE TYPE: ICD-10-CM

## 2020-09-24 RX ORDER — SUMATRIPTAN 50 MG/1
100 TABLET, FILM COATED ORAL ONCE AS NEEDED
Qty: 9 TABLET | Refills: 5 | Status: SHIPPED | OUTPATIENT
Start: 2020-09-24 | End: 2022-01-03

## 2020-10-26 ENCOUNTER — OFFICE VISIT (OUTPATIENT)
Dept: FAMILY MEDICINE CLINIC | Facility: CLINIC | Age: 25
End: 2020-10-26

## 2020-10-26 VITALS
TEMPERATURE: 97.5 F | WEIGHT: 258 LBS | HEART RATE: 96 BPM | SYSTOLIC BLOOD PRESSURE: 122 MMHG | RESPIRATION RATE: 18 BRPM | HEIGHT: 66 IN | DIASTOLIC BLOOD PRESSURE: 86 MMHG | BODY MASS INDEX: 41.46 KG/M2 | OXYGEN SATURATION: 99 %

## 2020-10-26 DIAGNOSIS — R51.9 ACUTE INTRACTABLE HEADACHE, UNSPECIFIED HEADACHE TYPE: Primary | ICD-10-CM

## 2020-10-26 DIAGNOSIS — R51.9 SINUS HEADACHE: ICD-10-CM

## 2020-10-26 DIAGNOSIS — K12.2 MOUTH ABSCESS: ICD-10-CM

## 2020-10-26 PROCEDURE — 99214 OFFICE O/P EST MOD 30 MIN: CPT | Performed by: FAMILY MEDICINE

## 2020-10-26 RX ORDER — MONTELUKAST SODIUM 10 MG/1
10 TABLET ORAL NIGHTLY
Qty: 30 TABLET | Refills: 12 | Status: SHIPPED | OUTPATIENT
Start: 2020-10-26 | End: 2020-10-28

## 2020-10-26 RX ORDER — AMOXICILLIN 875 MG/1
875 TABLET, COATED ORAL 2 TIMES DAILY
Qty: 28 TABLET | Refills: 1 | Status: SHIPPED | OUTPATIENT
Start: 2020-10-26 | End: 2020-11-09

## 2020-10-26 NOTE — PROGRESS NOTES
Subjective   KATHERINE TENORIO is a 24 y.o. female.     Found a pus pocket on right side of gums this morning. Has an abscess tooth, finished Clindamycin from dentist last week. Has an appt with dentist on 11/17/20.     Tried Imitrex and Ibuprofen for Migraine with no relief. Headache located in temple area and behind eyes.     Migraine   This is a chronic problem. The current episode started yesterday. The problem occurs constantly. The pain is located in the right unilateral region. Associated symptoms include sinus pressure. Pertinent negatives include no abdominal pain, ear pain, fever, nausea, neck pain, numbness, swollen glands, vomiting or weakness.   Dental Pain   The current episode started today. Associated symptoms include sinus pressure. Pertinent negatives include no fever.        The following portions of the patient's history were reviewed and updated as appropriate: allergies, current medications, past family history, past medical history, past social history, past surgical history and problem list.    Patient Active Problem List   Diagnosis   • GERD (gastroesophageal reflux disease)   • Anemia affecting pregnancy   • Encounter for initial prescription of contraceptive pills   • Recurrent headache   • Essential hypertension   • Anxiety   • Situational mixed anxiety and depressive disorder       Current Outpatient Medications on File Prior to Visit   Medication Sig Dispense Refill   • busPIRone (BUSPAR) 5 MG tablet Take 1 tablet by mouth 3 (Three) Times a Day. 90 tablet 5   • hydroCHLOROthiazide (HYDRODIURIL) 25 MG tablet Take 1 tablet by mouth Daily. 30 tablet 5   • promethazine (PHENERGAN) 25 MG tablet Take 1 tablet by mouth Every 6 (Six) Hours As Needed for Nausea or Vomiting. 30 tablet 0   • SUMAtriptan (IMITREX) 50 MG tablet Take 2 tablets by mouth 1 (One) Time As Needed for Migraine (Take one at onset of symptoms. May repeat in two hours (Maximum two per day).). 9 tablet 5   • venlafaxine XR  (EFFEXOR-XR) 37.5 MG 24 hr capsule Take 1 capsule by mouth Daily. 30 capsule 5     No current facility-administered medications on file prior to visit.      Current outpatient and discharge medications have been reconciled for the patient.  Reviewed by: Abilio Oneill MD      No Known Allergies    Review of Systems   Constitutional: Negative for activity change, appetite change, fatigue and fever.   HENT: Positive for sinus pressure. Negative for ear pain, swollen glands and voice change.    Eyes: Negative for visual disturbance.   Respiratory: Negative for shortness of breath and wheezing.    Cardiovascular: Negative for chest pain and leg swelling.   Gastrointestinal: Negative for abdominal pain, blood in stool, constipation, diarrhea, nausea and vomiting.   Endocrine: Negative for polydipsia and polyuria.   Genitourinary: Negative for dysuria, frequency and hematuria.   Musculoskeletal: Negative for joint swelling, neck pain and neck stiffness.   Skin: Negative for rash and bruise.   Neurological: Positive for headache. Negative for weakness and numbness.   Psychiatric/Behavioral: Negative for suicidal ideas and depressed mood.     I have reviewed and confirmed the accuracy of the ROS as documented by the MA/LPN/RN Abilio Oneill MD      Objective   Vitals:    10/26/20 1552   BP: 122/86   Pulse: 96   Resp: 18   Temp: 97.5 °F (36.4 °C)   SpO2: 99%     Physical Exam  Constitutional:       Appearance: She is well-developed.   HENT:      Head: Normocephalic and atraumatic.      Right Ear: External ear normal.      Left Ear: External ear normal.      Nose: Nasal tenderness present.   Eyes:      Pupils: Pupils are equal, round, and reactive to light.   Neck:      Musculoskeletal: Normal range of motion and neck supple.   Cardiovascular:      Rate and Rhythm: Normal rate and regular rhythm.      Heart sounds: Normal heart sounds.   Pulmonary:      Effort: Pulmonary effort is normal.      Breath sounds:  Normal breath sounds.   Abdominal:      General: Bowel sounds are normal.      Palpations: Abdomen is soft.   Musculoskeletal: Normal range of motion.   Skin:     General: Skin is warm and dry.   Neurological:      Mental Status: She is alert and oriented to person, place, and time.   Psychiatric:         Behavior: Behavior normal.         Thought Content: Thought content normal.         Judgment: Judgment normal.         I wore protective equipment throughout this patient encounter to include mask and eye protection. Hand hygiene was performed before donning protective equipment and after removal when leaving the room.    Assessment/Plan .  Diagnoses and all orders for this visit:    1. Acute intractable headache, unspecified headache type (Primary)    2. Mouth abscess  -     amoxicillin (AMOXIL) 875 MG tablet; Take 1 tablet by mouth 2 (Two) Times a Day for 14 days.  Dispense: 28 tablet; Refill: 1    3. Sinus headache  -     montelukast (SINGULAIR) 10 MG tablet; Take 1 tablet by mouth Every Night.  Dispense: 30 tablet; Refill: 12    Findings discussed. All questions answered.  Medication and medication adverse effects discussed.  Drug education given and explained to patient. Patient verbalized understanding.  Follow-up in 2 weeks if not better.  Follow-up sooner for worsening symptoms or for any concerns.   Keep dentist appt

## 2020-10-28 ENCOUNTER — OFFICE VISIT (OUTPATIENT)
Dept: FAMILY MEDICINE CLINIC | Facility: CLINIC | Age: 25
End: 2020-10-28

## 2020-10-28 VITALS
TEMPERATURE: 97.5 F | DIASTOLIC BLOOD PRESSURE: 62 MMHG | HEIGHT: 66 IN | SYSTOLIC BLOOD PRESSURE: 126 MMHG | BODY MASS INDEX: 41.64 KG/M2 | HEART RATE: 70 BPM | OXYGEN SATURATION: 98 % | RESPIRATION RATE: 16 BRPM

## 2020-10-28 DIAGNOSIS — R68.2 DRY MOUTH: ICD-10-CM

## 2020-10-28 DIAGNOSIS — R51.9 SINUS HEADACHE: Primary | ICD-10-CM

## 2020-10-28 PROCEDURE — 99213 OFFICE O/P EST LOW 20 MIN: CPT | Performed by: FAMILY MEDICINE

## 2020-10-28 RX ORDER — FLUTICASONE PROPIONATE 50 MCG
2 SPRAY, SUSPENSION (ML) NASAL DAILY
Qty: 1 BOTTLE | Refills: 5 | Status: SHIPPED | OUTPATIENT
Start: 2020-10-28 | End: 2022-01-03

## 2020-12-15 ENCOUNTER — OFFICE VISIT (OUTPATIENT)
Dept: FAMILY MEDICINE CLINIC | Facility: CLINIC | Age: 25
End: 2020-12-15

## 2020-12-15 VITALS
RESPIRATION RATE: 17 BRPM | BODY MASS INDEX: 41.09 KG/M2 | TEMPERATURE: 97.3 F | SYSTOLIC BLOOD PRESSURE: 120 MMHG | HEART RATE: 69 BPM | WEIGHT: 254.6 LBS | OXYGEN SATURATION: 98 % | DIASTOLIC BLOOD PRESSURE: 80 MMHG

## 2020-12-15 DIAGNOSIS — J01.00 ACUTE NON-RECURRENT MAXILLARY SINUSITIS: Primary | ICD-10-CM

## 2020-12-15 PROCEDURE — 99213 OFFICE O/P EST LOW 20 MIN: CPT | Performed by: FAMILY MEDICINE

## 2020-12-15 RX ORDER — AMOXICILLIN 500 MG/1
500 TABLET, FILM COATED ORAL 3 TIMES DAILY
Qty: 30 TABLET | Refills: 0 | Status: SHIPPED | OUTPATIENT
Start: 2020-12-15 | End: 2021-01-17 | Stop reason: SDUPTHER

## 2020-12-15 NOTE — PROGRESS NOTES
Subjective   Louise Dee is a 24 y.o. female.     Headache   This is a new problem. The current episode started in the past 7 days (3 days ago). The problem occurs daily. The problem has been unchanged. The pain is located in the bilateral region. The pain radiates to the face. The pain quality is similar to prior headaches. The quality of the pain is described as aching, stabbing and throbbing. Associated symptoms include dizziness, ear pain (right), sinus pressure and a visual change. Pertinent negatives include no abdominal pain, fever, nausea, neck pain, swollen glands, vomiting or weakness. The symptoms are aggravated by bright light. She has tried nothing for the symptoms. Her past medical history is significant for migraine headaches.      The following portions of the patient's history were reviewed and updated as appropriate: allergies, current medications, past family history, past medical history, past social history, past surgical history and problem list.    Allergies:  No Known Allergies    Social History:  Social History     Socioeconomic History   • Marital status:      Spouse name: Not on file   • Number of children: Not on file   • Years of education: Not on file   • Highest education level: Not on file   Tobacco Use   • Smoking status: Never Smoker   • Smokeless tobacco: Never Used   Substance and Sexual Activity   • Drug use: No   • Sexual activity: Not Currently     Partners: Male       Family History:  Family History   Problem Relation Age of Onset   • Heart disease Father    • Hypertension Father    • Kidney disease Father    • Diabetes Father    • Depression Father    • Anxiety disorder Father    • Alcohol abuse Father        Past Medical History :  Patient Active Problem List   Diagnosis   • GERD (gastroesophageal reflux disease)   • Anemia affecting pregnancy   • Encounter for initial prescription of contraceptive pills   • Recurrent headache   • Essential hypertension   • Anxiety    • Situational mixed anxiety and depressive disorder       Medication List:  Outpatient Encounter Medications as of 12/15/2020   Medication Sig Dispense Refill   • busPIRone (BUSPAR) 5 MG tablet Take 1 tablet by mouth 3 (Three) Times a Day. 90 tablet 5   • fluticasone (FLONASE) 50 MCG/ACT nasal spray 2 sprays into the nostril(s) as directed by provider Daily. 1 bottle 5   • hydroCHLOROthiazide (HYDRODIURIL) 25 MG tablet Take 1 tablet by mouth Daily. 30 tablet 5   • promethazine (PHENERGAN) 25 MG tablet Take 1 tablet by mouth Every 6 (Six) Hours As Needed for Nausea or Vomiting. 30 tablet 0   • SUMAtriptan (IMITREX) 50 MG tablet Take 2 tablets by mouth 1 (One) Time As Needed for Migraine (Take one at onset of symptoms. May repeat in two hours (Maximum two per day).). 9 tablet 5   • venlafaxine XR (EFFEXOR-XR) 37.5 MG 24 hr capsule Take 1 capsule by mouth Daily. 30 capsule 5   • amoxicillin (AMOXIL) 500 MG tablet Take 1 tablet by mouth 3 (Three) Times a Day for 10 days. 30 tablet 0     No facility-administered encounter medications on file as of 12/15/2020.        Past Surgical History:  History reviewed. No pertinent surgical history.    Review of Systems:  Review of Systems   Constitutional: Negative for activity change, appetite change, fatigue and fever.   HENT: Positive for ear pain (right) and sinus pressure. Negative for swollen glands and voice change.    Eyes: Negative for visual disturbance.   Respiratory: Negative for shortness of breath and wheezing.    Cardiovascular: Negative for chest pain and leg swelling.   Gastrointestinal: Negative for abdominal pain, blood in stool, constipation, diarrhea, nausea and vomiting.   Endocrine: Negative for polydipsia and polyuria.   Genitourinary: Negative for dysuria, frequency and hematuria.   Musculoskeletal: Negative for joint swelling, neck pain and neck stiffness.   Skin: Negative for rash and bruise.   Neurological: Positive for dizziness and headache. Negative  for weakness.   Psychiatric/Behavioral: Negative for suicidal ideas and depressed mood.       I have reviewed and confirmed the accuracy of the HPI and ROS as documented by the MA/LPN/RN Abilio Oneill MD    Vital Signs:  Visit Vitals  /80   Pulse 69   Temp 97.3 °F (36.3 °C)   Resp 17   Wt 115 kg (254 lb 9.6 oz)   LMP 12/13/2020 (Exact Date)   SpO2 98%   BMI 41.09 kg/m²       Physical Exam  Constitutional:       Appearance: She is well-developed.   HENT:      Head: Normocephalic and atraumatic.        Right Ear: External ear normal.      Left Ear: External ear normal.      Nose: Nose normal.   Eyes:      Pupils: Pupils are equal, round, and reactive to light.   Neck:      Musculoskeletal: Normal range of motion and neck supple.   Cardiovascular:      Rate and Rhythm: Normal rate and regular rhythm.      Heart sounds: Normal heart sounds.   Pulmonary:      Effort: Pulmonary effort is normal.      Breath sounds: Normal breath sounds.   Abdominal:      General: Bowel sounds are normal.      Palpations: Abdomen is soft.   Musculoskeletal: Normal range of motion.   Skin:     General: Skin is warm and dry.   Neurological:      Mental Status: She is alert and oriented to person, place, and time.   Psychiatric:         Behavior: Behavior normal.         Thought Content: Thought content normal.         Judgment: Judgment normal.       Assessment and Plan:  Problem List Items Addressed This Visit     None      Visit Diagnoses     Acute non-recurrent maxillary sinusitis    -  Primary    Relevant Medications    amoxicillin (AMOXIL) 500 MG tablet      Findings discussed. All questions answered.  Medication and medication adverse effects discussed.  Drug education given and explained to patient. Patient verbalized understanding.  OTC meds discussed  Follow-up in 2 weeks if not better.  Follow-up sooner for worsening symptoms or for any concerns.    An After Visit Summary and PPPS were given to the patient.       I  wore protective equipment throughout this patient encounter to include mask and eye protection. Hand hygiene was performed before donning protective equipment and after removal when leaving the room.

## 2020-12-16 ENCOUNTER — TELEPHONE (OUTPATIENT)
Dept: FAMILY MEDICINE CLINIC | Facility: CLINIC | Age: 25
End: 2020-12-16

## 2020-12-16 NOTE — TELEPHONE ENCOUNTER
Patient called stating she saw another provider in office yesterday and that he wanted her to follow up with PCP called patient back to check on her and she how she was feeling an if she started the medication that was given to her. No answer vm left to call me back here at the office.

## 2021-01-17 DIAGNOSIS — J01.00 ACUTE NON-RECURRENT MAXILLARY SINUSITIS: ICD-10-CM

## 2021-01-18 RX ORDER — AMOXICILLIN 500 MG/1
500 TABLET, FILM COATED ORAL 3 TIMES DAILY
Qty: 30 TABLET | Refills: 0 | Status: SHIPPED | OUTPATIENT
Start: 2021-01-18 | End: 2021-04-15 | Stop reason: SDUPTHER

## 2021-02-09 ENCOUNTER — OFFICE VISIT (OUTPATIENT)
Dept: FAMILY MEDICINE CLINIC | Facility: CLINIC | Age: 26
End: 2021-02-09

## 2021-02-09 VITALS
HEART RATE: 80 BPM | OXYGEN SATURATION: 99 % | HEIGHT: 66 IN | WEIGHT: 250.6 LBS | SYSTOLIC BLOOD PRESSURE: 125 MMHG | TEMPERATURE: 97.3 F | DIASTOLIC BLOOD PRESSURE: 86 MMHG | BODY MASS INDEX: 40.27 KG/M2 | RESPIRATION RATE: 18 BRPM

## 2021-02-09 DIAGNOSIS — K12.2 MOUTH ABSCESS: Primary | ICD-10-CM

## 2021-02-09 PROCEDURE — 99213 OFFICE O/P EST LOW 20 MIN: CPT | Performed by: FAMILY MEDICINE

## 2021-02-09 RX ORDER — CLINDAMYCIN HYDROCHLORIDE 300 MG/1
300 CAPSULE ORAL 3 TIMES DAILY
Qty: 30 CAPSULE | Refills: 1 | Status: SHIPPED | OUTPATIENT
Start: 2021-02-09 | End: 2021-03-11

## 2021-02-09 NOTE — PROGRESS NOTES
Subjective   Louise Dee is a 25 y.o. female.     Left lower jaw pain and swelling. H/O tooth abcesses in the past. Pt started taking some left over amoxil from last time home but sx persist    Jaw Pain  This is a new problem. The current episode started in the past 7 days. The problem occurs constantly. The problem has been gradually worsening. Associated symptoms include headaches. Pertinent negatives include no abdominal pain, chest pain, congestion, coughing, fatigue, fever, joint swelling, nausea, neck pain, numbness, rash, sore throat, swollen glands, vomiting or weakness. The symptoms are aggravated by eating. She has tried nothing for the symptoms. The treatment provided no relief.        The following portions of the patient's history were reviewed and updated as appropriate: allergies, current medications, past family history, past medical history, past social history, past surgical history and problem list.    Patient Active Problem List   Diagnosis   • GERD (gastroesophageal reflux disease)   • Anemia affecting pregnancy   • Encounter for initial prescription of contraceptive pills   • Recurrent headache   • Essential hypertension   • Anxiety   • Situational mixed anxiety and depressive disorder       Current Outpatient Medications on File Prior to Visit   Medication Sig Dispense Refill   • busPIRone (BUSPAR) 5 MG tablet Take 1 tablet by mouth 3 (Three) Times a Day. 90 tablet 5   • fluticasone (FLONASE) 50 MCG/ACT nasal spray 2 sprays into the nostril(s) as directed by provider Daily. 1 bottle 5   • hydroCHLOROthiazide (HYDRODIURIL) 25 MG tablet Take 1 tablet by mouth Daily. 30 tablet 5   • promethazine (PHENERGAN) 25 MG tablet Take 1 tablet by mouth Every 6 (Six) Hours As Needed for Nausea or Vomiting. 30 tablet 0   • SUMAtriptan (IMITREX) 50 MG tablet Take 2 tablets by mouth 1 (One) Time As Needed for Migraine (Take one at onset of symptoms. May repeat in two hours (Maximum two per day).). 9 tablet 5    • venlafaxine XR (EFFEXOR-XR) 37.5 MG 24 hr capsule Take 1 capsule by mouth Daily. 30 capsule 5     No current facility-administered medications on file prior to visit.      Current outpatient and discharge medications have been reconciled for the patient.  Reviewed by: Abilio Oneill MD      No Known Allergies    Review of Systems   Constitutional: Negative for activity change, appetite change, fatigue and fever.   HENT: Positive for dental problem and facial swelling. Negative for congestion, ear pain, sore throat, swollen glands and voice change.    Eyes: Negative for visual disturbance.   Respiratory: Negative for cough, shortness of breath and wheezing.    Cardiovascular: Negative for chest pain and leg swelling.   Gastrointestinal: Negative for abdominal pain, blood in stool, constipation, diarrhea, nausea and vomiting.   Endocrine: Negative for polydipsia and polyuria.   Genitourinary: Negative for dysuria, frequency and hematuria.   Musculoskeletal: Negative for joint swelling, neck pain and neck stiffness.   Skin: Negative for rash and bruise.   Neurological: Negative for weakness, numbness and headache.   Psychiatric/Behavioral: Negative for suicidal ideas and depressed mood.     I have reviewed and confirmed the accuracy of the ROS as documented by the MA/LPN/RN Abilio Oneill MD    Objective   Vitals:    02/09/21 0955   BP: 125/86   Pulse: 80   Resp: 18   Temp: 97.3 °F (36.3 °C)   SpO2: 99%     Physical Exam  Constitutional:       Appearance: She is well-developed.   HENT:      Head: Normocephalic and atraumatic.      Right Ear: External ear normal.      Left Ear: External ear normal.      Nose: Nose normal.   Eyes:      Pupils: Pupils are equal, round, and reactive to light.   Neck:      Musculoskeletal: Normal range of motion and neck supple.     Cardiovascular:      Rate and Rhythm: Normal rate and regular rhythm.      Heart sounds: Normal heart sounds.   Pulmonary:      Effort:  Pulmonary effort is normal.      Breath sounds: Normal breath sounds.   Abdominal:      General: Bowel sounds are normal.      Palpations: Abdomen is soft.   Musculoskeletal: Normal range of motion.   Skin:     General: Skin is warm and dry.   Neurological:      Mental Status: She is alert and oriented to person, place, and time.   Psychiatric:         Behavior: Behavior normal.         Thought Content: Thought content normal.         Judgment: Judgment normal.             Assessment/Plan .  Problem List Items Addressed This Visit     None      Visit Diagnoses     Mouth abscess    -  Primary    Relevant Medications    clindamycin (CLEOCIN) 300 MG capsule       Findings discussed. All questions answered.  Medication and medication adverse effects discussed.  Drug education given and explained to patient. Patient verbalized understanding.  Follow-up in 1 week if not better.  Follow-up sooner for worsening symptoms or for any concerns.  [facility] dental appt    I wore protective equipment throughout this patient encounter to include mask and eye protection. Hand hygiene was performed before donning protective equipment and after removal when leaving the room

## 2021-03-11 ENCOUNTER — OFFICE VISIT (OUTPATIENT)
Dept: FAMILY MEDICINE CLINIC | Facility: CLINIC | Age: 26
End: 2021-03-11

## 2021-03-11 VITALS
SYSTOLIC BLOOD PRESSURE: 130 MMHG | HEART RATE: 107 BPM | HEIGHT: 66 IN | DIASTOLIC BLOOD PRESSURE: 80 MMHG | BODY MASS INDEX: 39.47 KG/M2 | TEMPERATURE: 98.6 F | OXYGEN SATURATION: 95 % | WEIGHT: 245.6 LBS | RESPIRATION RATE: 18 BRPM

## 2021-03-11 DIAGNOSIS — R07.9 CHEST PAIN, UNSPECIFIED TYPE: ICD-10-CM

## 2021-03-11 DIAGNOSIS — E66.01 MORBIDLY OBESE (HCC): ICD-10-CM

## 2021-03-11 DIAGNOSIS — K04.7 DENTAL INFECTION: Primary | ICD-10-CM

## 2021-03-11 PROCEDURE — 93000 ELECTROCARDIOGRAM COMPLETE: CPT | Performed by: FAMILY MEDICINE

## 2021-03-11 PROCEDURE — 99214 OFFICE O/P EST MOD 30 MIN: CPT | Performed by: FAMILY MEDICINE

## 2021-03-11 RX ORDER — AMOXICILLIN AND CLAVULANATE POTASSIUM 500; 125 MG/1; MG/1
1 TABLET, FILM COATED ORAL 3 TIMES DAILY
Qty: 30 TABLET | Refills: 0 | Status: SHIPPED | OUTPATIENT
Start: 2021-03-11 | End: 2021-03-21

## 2021-03-11 RX ORDER — LIDOCAINE HYDROCHLORIDE 20 MG/ML
5 SOLUTION OROPHARYNGEAL
Qty: 100 ML | Refills: 0 | Status: SHIPPED | OUTPATIENT
Start: 2021-03-11 | End: 2022-01-03

## 2021-03-11 RX ORDER — HYDROCODONE BITARTRATE AND ACETAMINOPHEN 5; 325 MG/1; MG/1
1 TABLET ORAL EVERY 8 HOURS PRN
Qty: 10 TABLET | Refills: 0 | Status: SHIPPED | OUTPATIENT
Start: 2021-03-11 | End: 2021-08-18

## 2021-03-11 NOTE — PROGRESS NOTES
Subjective   Louise Dee is a 25 y.o. female.   Chief Complaint   Patient presents with   • Facial Swelling   • Chest Pain     Facial Swelling  This is a new (but she had similar issues last month - saw Dr. Oneill) problem. The current episode started yesterday. The problem occurs constantly. The problem has been gradually worsening. Associated symptoms include chest pain and a sore throat. Pertinent negatives include no chills, diaphoresis, fatigue or fever. The treatment provided no relief.   Chest Pain   This is a new problem. The current episode started today. The onset quality is gradual (after taking 4 Tylenol). The problem occurs intermittently. The problem has been waxing and waning. The pain is present in the epigastric region. The pain is at a severity of 8/10. The pain is moderate. The quality of the pain is described as stabbing, sharp and pressure. The pain radiates to the mid back. Associated symptoms include shortness of breath. Pertinent negatives include no diaphoresis or fever. The treatment provided no relief.      She thinks chest pain may have been from the medications she was taking for her face pain/swelling.    The following portions of the patient's history were reviewed and updated as appropriate: allergies, current medications, past family history, past medical history, past social history, past surgical history and problem list.      Past Medical History:   Diagnosis Date   • Anemia affecting pregnancy    • GERD (gastroesophageal reflux disease)    • History of chicken pox        History reviewed. No pertinent surgical history.     Family History   Problem Relation Age of Onset   • Heart disease Father    • Hypertension Father    • Kidney disease Father    • Diabetes Father    • Depression Father    • Anxiety disorder Father    • Alcohol abuse Father         Social History     Socioeconomic History   • Marital status:      Spouse name: Not on file   • Number of children: Not on  "file   • Years of education: Not on file   • Highest education level: Not on file   Tobacco Use   • Smoking status: Never Smoker   • Smokeless tobacco: Never Used   Substance and Sexual Activity   • Drug use: No   • Sexual activity: Not Currently     Partners: Male         Review of Systems   Constitutional: Negative for chills, diaphoresis, fatigue and fever.   HENT: Positive for facial swelling and sore throat.    Respiratory: Positive for shortness of breath.    Cardiovascular: Positive for chest pain.       Objective   Visit Vitals  /80 (BP Location: Left arm, Patient Position: Sitting, Cuff Size: Large Adult)   Pulse 107   Temp 98.6 °F (37 °C) (Temporal)   Resp 18   Ht 167.6 cm (66\")   Wt 111 kg (245 lb 9.6 oz)   SpO2 95%   BMI 39.64 kg/m²       Physical Exam  Constitutional:       General: She is not in acute distress.     Appearance: She is well-developed.   HENT:      Head: Normocephalic and atraumatic.      Right Ear: External ear normal.      Left Ear: External ear normal.      Nose: Nose normal.      Mouth/Throat:      Dentition: Abnormal dentition. Dental tenderness, dental caries and gum lesions present.   Eyes:      Pupils: Pupils are equal, round, and reactive to light.   Neck:      Trachea: Trachea normal.      Comments: Neck and jaw swelling  Cardiovascular:      Rate and Rhythm: Normal rate and regular rhythm.      Heart sounds: Normal heart sounds.   Pulmonary:      Effort: Pulmonary effort is normal.      Breath sounds: Normal breath sounds.   Abdominal:      General: Bowel sounds are normal.      Palpations: Abdomen is soft.   Musculoskeletal:         General: Normal range of motion.      Cervical back: Normal range of motion and neck supple. No rigidity.   Lymphadenopathy:      Cervical: Cervical adenopathy present.   Skin:     General: Skin is warm and dry.   Neurological:      Mental Status: She is alert and oriented to person, place, and time.   Psychiatric:         Behavior: Behavior " normal.         Thought Content: Thought content normal.         Judgment: Judgment normal.         ECG 12 Lead    Date/Time: 3/11/2021 2:50 PM  Performed by: Inocencia Salas MD  Authorized by: Inocencia Salas MD   Previous ECG: no previous ECG available  Rhythm: sinus arrhythmia  Rate: normal  BPM: 71  ST Segments: ST segments normal  T Waves: T waves normal  QRS axis: normal  Other findings comments: possible short MI    Clinical impression: non-specific ECG            Assessment/Plan      Diagnoses and all orders for this visit:    1. Dental infection (Primary)  Comments:  Worsening.  She was advised to see dentist.  She has an appt, but has to wait several weeks for evaluation.  Orders:  -     amoxicillin-clavulanate (Augmentin) 500-125 MG per tablet; Take 1 tablet by mouth 3 (Three) Times a Day for 10 days.  Dispense: 30 tablet; Refill: 0  -     Lidocaine Viscous HCl (XYLOCAINE) 2 % solution; Take 5 mL by mouth Every 3 (Three) Hours As Needed for Moderate Pain .  Dispense: 100 mL; Refill: 0  -     HYDROcodone-acetaminophen (NORCO) 5-325 MG per tablet; Take 1 tablet by mouth Every 8 (Eight) Hours As Needed for Severe Pain .  Dispense: 10 tablet; Refill: 0    2. Chest pain, unspecified type  Comments:  Possibly from meds.  Monitor.  Orders:  -     ECG 12 Lead    3. Morbidly obese (CMS/HCC)    I wore protective equipment throughout this patient encounter to include mask and gloves. Hand hygiene was performed before donning protective equipment and after removal when leaving the room.

## 2021-04-14 ENCOUNTER — TELEPHONE (OUTPATIENT)
Dept: FAMILY MEDICINE CLINIC | Facility: CLINIC | Age: 26
End: 2021-04-14

## 2021-04-14 NOTE — TELEPHONE ENCOUNTER
Caller: Louise Dee    Relationship to patient: Self    Best call back number: [phone]     Patient is needing: PATIENT CALLED IN TO HAVE AMOXICILLIN REFILLED IT IS NOT LISTED IN THE MEDICATIONS.  PLEASE ADVISE.  PATIENT WOULD LIKE MEDICATION SENT TO,       [facility] [address] [facility], [place] - [address]  - [phone] [facility] [phone] FX

## 2021-04-15 DIAGNOSIS — J01.00 ACUTE NON-RECURRENT MAXILLARY SINUSITIS: ICD-10-CM

## 2021-04-15 RX ORDER — AMOXICILLIN 500 MG/1
500 TABLET, FILM COATED ORAL 3 TIMES DAILY
Qty: 30 TABLET | Refills: 0 | Status: SHIPPED | OUTPATIENT
Start: 2021-04-15 | End: 2021-04-25

## 2021-05-28 ENCOUNTER — OFFICE VISIT (OUTPATIENT)
Dept: FAMILY MEDICINE CLINIC | Facility: CLINIC | Age: 26
End: 2021-05-28

## 2021-05-28 VITALS
SYSTOLIC BLOOD PRESSURE: 128 MMHG | TEMPERATURE: 97.8 F | BODY MASS INDEX: 41.21 KG/M2 | WEIGHT: 256.4 LBS | HEART RATE: 106 BPM | RESPIRATION RATE: 18 BRPM | HEIGHT: 66 IN | OXYGEN SATURATION: 98 % | DIASTOLIC BLOOD PRESSURE: 78 MMHG

## 2021-05-28 DIAGNOSIS — J02.9 PHARYNGITIS, UNSPECIFIED ETIOLOGY: Primary | ICD-10-CM

## 2021-05-28 PROCEDURE — 99212 OFFICE O/P EST SF 10 MIN: CPT | Performed by: FAMILY MEDICINE

## 2021-05-28 RX ORDER — AZITHROMYCIN 250 MG/1
TABLET, FILM COATED ORAL
Qty: 6 TABLET | Refills: 0 | Status: SHIPPED | OUTPATIENT
Start: 2021-05-28 | End: 2021-08-18

## 2021-05-28 NOTE — PROGRESS NOTES
Subjective   Louise Dee is a 25 y.o. female.     Woke up yesterday after 3 hour nap which is unusual for her with a headache, now with sore scratchy throat. No wheeze, no rash, no fever. No n/v/d/    Sore Throat   This is a new problem. The current episode started yesterday. The problem has been waxing and waning. There has been no fever. The pain is mild. Associated symptoms include headaches. Pertinent negatives include no abdominal pain, diarrhea, ear pain, neck pain, shortness of breath, swollen glands, trouble swallowing or vomiting. She has tried acetaminophen for the symptoms. The treatment provided no relief.        The following portions of the patient's history were reviewed and updated as appropriate: allergies, current medications, past family history, past medical history, past social history, past surgical history and problem list.    Patient Active Problem List   Diagnosis   • GERD (gastroesophageal reflux disease)   • Anemia affecting pregnancy   • Encounter for initial prescription of contraceptive pills   • Recurrent headache   • Essential hypertension   • Anxiety   • Situational mixed anxiety and depressive disorder   • Morbidly obese (CMS/[facility])       Current Outpatient Medications on File Prior to Visit   Medication Sig Dispense Refill   • busPIRone (BUSPAR) 5 MG tablet Take 1 tablet by mouth 3 (Three) Times a Day. 90 tablet 5   • fluticasone (FLONASE) 50 MCG/ACT nasal spray 2 sprays into the nostril(s) as directed by provider Daily. 1 bottle 5   • hydroCHLOROthiazide (HYDRODIURIL) 25 MG tablet Take 1 tablet by mouth Daily. 30 tablet 5   • HYDROcodone-acetaminophen (NORCO) 5-325 MG per tablet Take 1 tablet by mouth Every 8 (Eight) Hours As Needed for Severe Pain . 10 tablet 0   • promethazine (PHENERGAN) 25 MG tablet Take 1 tablet by mouth Every 6 (Six) Hours As Needed for Nausea or Vomiting. 30 tablet 0   • SUMAtriptan (IMITREX) 50 MG tablet Take 2 tablets by mouth 1 (One) Time As Needed for  "Migraine (Take one at onset of symptoms. May repeat in two hours (Maximum two per day).). 9 tablet 5   • venlafaxine XR (EFFEXOR-XR) 37.5 MG 24 hr capsule Take 1 capsule by mouth Daily. 30 capsule 5   • Lidocaine Viscous HCl (XYLOCAINE) 2 % solution Take 5 mL by mouth Every 3 (Three) Hours As Needed for Moderate Pain . 100 mL 0     No current facility-administered medications on file prior to visit.     Current outpatient and discharge medications have been reconciled for the patient.  Reviewed by: Abilio Oneill MD      No Known Allergies    Review of Systems   Constitutional: Negative for activity change, appetite change, fatigue and fever.   HENT: Positive for sore throat. Negative for ear pain, swollen glands, trouble swallowing and voice change.    Eyes: Negative for visual disturbance.   Respiratory: Negative for shortness of breath and wheezing.    Cardiovascular: Negative for chest pain and leg swelling.   Gastrointestinal: Negative for abdominal pain, blood in stool, constipation, diarrhea, nausea and vomiting.   Endocrine: Negative for polydipsia and polyuria.   Genitourinary: Negative for dysuria, frequency and hematuria.   Musculoskeletal: Negative for joint swelling, neck pain and neck stiffness.   Skin: Negative for rash and bruise.   Neurological: Negative for weakness, numbness and headache.   Psychiatric/Behavioral: Negative for suicidal ideas and depressed mood.     I have reviewed and confirmed the accuracy of the ROS as documented by the MA/LPN/RN Abilio Oneill MD    Objective   Visit Vitals  /78   Pulse 106   Temp 97.8 °F (36.6 °C)   Resp 18   Ht 167.6 cm (65.98\")   Wt 116 kg (256 lb 6.4 oz)   LMP 05/01/2021 (Approximate)   SpO2 98%   BMI 41.40 kg/m²       Physical Exam  Constitutional:       Appearance: She is well-developed.   HENT:      Head: Normocephalic and atraumatic.      Right Ear: External ear normal.      Left Ear: External ear normal.      Nose: Nose normal.    "   Mouth/Throat:      Mouth: Mucous membranes are moist.      Pharynx: Oropharynx is clear. No posterior oropharyngeal erythema or uvula swelling.      Tonsils: No tonsillar exudate.   Eyes:      Pupils: Pupils are equal, round, and reactive to light.   Cardiovascular:      Rate and Rhythm: Normal rate and regular rhythm.      Heart sounds: Normal heart sounds.   Pulmonary:      Effort: Pulmonary effort is normal.      Breath sounds: Normal breath sounds.   Abdominal:      General: Bowel sounds are normal.      Palpations: Abdomen is soft.   Musculoskeletal:         General: Normal range of motion.      Cervical back: Normal range of motion and neck supple.   Skin:     General: Skin is warm and dry.   Neurological:      Mental Status: She is alert and oriented to person, place, and time.   Psychiatric:         Behavior: Behavior normal.         Thought Content: Thought content normal.         Judgment: Judgment normal.         Assessment/Plan .  Patient's Body mass index is 41.4 kg/m². indicating that she is morbidly obese (BMI > 40 or > 35 with obesity - related health condition). Obesity-related health conditions include the following: hypertension. Obesity is unchanged. BMI is is above average; BMI management plan is completed. We discussed portion control and increasing exercise..     Louise Dee  reports that she has never smoked. She has never used smokeless tobacco..         Diagnoses and all orders for this visit:    1. Pharyngitis, unspecified etiology (Primary)  Comments:  suspect viral  Orders:  -     azithromycin (ZITHROMAX) 250 MG tablet; Take 2 tablets the first day, then 1 tablet daily for 4 days.  Dispense: 6 tablet; Refill: 0     Findings discussed. All questions answered.  Reassurance, education.  Natural course and self-limited nature of this condition discussed.  Consider starting antibiotics if symptoms persist or worsen over the next few days.    Gargle with warm salt water for symptomatic  relief of sore throat.     Follow-up in approximately 3 days for reevaluation if not improved.  Follow-up sooner for worsening symptoms or any concerns.       I wore protective equipment throughout this patient encounter to include mask and gloves. Hand hygiene was performed before donning protective equipment and after removal when leaving the room

## 2021-07-12 ENCOUNTER — CLINICAL SUPPORT (OUTPATIENT)
Dept: FAMILY MEDICINE CLINIC | Facility: CLINIC | Age: 26
End: 2021-07-12

## 2021-07-12 DIAGNOSIS — Z11.1 ENCOUNTER FOR PPD TEST: Primary | ICD-10-CM

## 2021-07-12 PROCEDURE — 86580 TB INTRADERMAL TEST: CPT | Performed by: FAMILY MEDICINE

## 2021-07-14 LAB
INDURATION: 0 MM (ref 0–10)
TB SKIN TEST: NEGATIVE

## 2021-07-19 ENCOUNTER — OFFICE VISIT (OUTPATIENT)
Dept: FAMILY MEDICINE CLINIC | Facility: CLINIC | Age: 26
End: 2021-07-19

## 2021-07-19 DIAGNOSIS — Z91.199 NO-SHOW FOR APPOINTMENT: Primary | ICD-10-CM

## 2021-07-31 DIAGNOSIS — F41.9 ANXIETY: ICD-10-CM

## 2021-08-02 RX ORDER — BUSPIRONE HYDROCHLORIDE 5 MG/1
TABLET ORAL
Qty: 90 TABLET | Refills: 2 | Status: SHIPPED | OUTPATIENT
Start: 2021-08-02 | End: 2022-01-03

## 2021-08-04 DIAGNOSIS — F41.9 ANXIETY: ICD-10-CM

## 2021-08-04 RX ORDER — BUSPIRONE HYDROCHLORIDE 5 MG/1
5 TABLET ORAL 3 TIMES DAILY
Qty: 90 TABLET | Refills: 0 | OUTPATIENT
Start: 2021-08-04

## 2021-08-18 ENCOUNTER — TELEPHONE (OUTPATIENT)
Dept: FAMILY MEDICINE CLINIC | Facility: CLINIC | Age: 26
End: 2021-08-18

## 2021-08-18 DIAGNOSIS — K52.9 ACUTE GASTROENTERITIS: Primary | ICD-10-CM

## 2021-08-18 RX ORDER — DIPHENOXYLATE HYDROCHLORIDE AND ATROPINE SULFATE 2.5; .025 MG/1; MG/1
1 TABLET ORAL 4 TIMES DAILY PRN
Qty: 12 TABLET | Refills: 0 | Status: SHIPPED | OUTPATIENT
Start: 2021-08-18 | End: 2021-09-01

## 2021-08-18 RX ORDER — ONDANSETRON 4 MG/1
4 TABLET, ORALLY DISINTEGRATING ORAL EVERY 8 HOURS PRN
Qty: 30 TABLET | Refills: 0 | Status: SHIPPED | OUTPATIENT
Start: 2021-08-18 | End: 2022-01-03

## 2021-08-18 NOTE — TELEPHONE ENCOUNTER
Caller: Louise Lopez    Relationship: Self    Best call back number:[phone]     What medication are you requesting:NAUSEA MEDICAINE AND SOMETHING FOR DIAHHREA    What are your current symptoms: NAUSEA / VOMITING / DIARRHEA    How long have you been experiencing symptoms: LAST TWO DAYS     Have you had these symptoms before:    [x] Yes  [] No    Have you been treated for these symptoms before:   [x] Yes  [] No    If a prescription is needed, what is your preferred pharmacy and phone number: [facility] [place] [facility] [address]  - [phone] [facility] [phone]      Additional notes:PATIENT STATED SHE HAS THE STOMACH VIRUS SHE WOULD LIKE TO GET SOMETHING TO HELP WITH DIARRHEA OR SOMETHING THAT'S OTC THAT WOULD HELP.

## 2021-08-18 NOTE — TELEPHONE ENCOUNTER
Please let patient know that I sent RX to pharmacy for ondansetron and lomotil.  Use lomotil only if needed for bad diarrhea.  I want her moving her bowels.  BRAT diet, push hydration fluids.  Be seen here or UC if she develops excessive thirst or bad dizziness.

## 2021-09-01 ENCOUNTER — OFFICE VISIT (OUTPATIENT)
Dept: FAMILY MEDICINE CLINIC | Facility: CLINIC | Age: 26
End: 2021-09-01

## 2021-09-01 VITALS
RESPIRATION RATE: 16 BRPM | HEIGHT: 66 IN | SYSTOLIC BLOOD PRESSURE: 142 MMHG | DIASTOLIC BLOOD PRESSURE: 90 MMHG | BODY MASS INDEX: 37.35 KG/M2 | TEMPERATURE: 98.2 F | HEART RATE: 91 BPM | WEIGHT: 232.4 LBS | OXYGEN SATURATION: 96 %

## 2021-09-01 DIAGNOSIS — E66.01 MORBIDLY OBESE (HCC): ICD-10-CM

## 2021-09-01 DIAGNOSIS — K64.5 THROMBOSED EXTERNAL HEMORRHOID: Primary | ICD-10-CM

## 2021-09-01 DIAGNOSIS — I10 ESSENTIAL HYPERTENSION: ICD-10-CM

## 2021-09-01 PROCEDURE — 99213 OFFICE O/P EST LOW 20 MIN: CPT | Performed by: FAMILY MEDICINE

## 2021-09-01 NOTE — PROGRESS NOTES
Chief Complaint   Patient presents with   • Hemorrhoids       Subjective   Louise Luis is a 25 y.o. female.     Hemorrhoids  This is a new problem. The current episode started 1 to 4 weeks ago (patient states that she had a stomach bug with diarrhea 3 weeks ago.  Then she took lomotil and got constipated.). The problem occurs daily. The problem has been unchanged. Associated symptoms include abdominal pain (low/mid abdomen, right side) and a change in bowel habit. Pertinent negatives include no chest pain, chills, fever, nausea, swollen glands, urinary symptoms or vomiting. Associated symptoms comments: Patient states that she is still having pain still. That it hurst the most to sit. Patient states that her stool has since gone back to normal. But is having a hard time with passing them due to the pain.   . The symptoms are aggravated by eating (patient states that any kind of pressure put on her lower half makes it hurt more. ). She has tried ice for the symptoms. The treatment provided mild relief.     Gastroenteritis symptoms mostly resolved.  No diarrhea.  Stools soft.     I have reviewed relevant past medical, family, social and surgical history for this patient.  Medications review is done by myself, with patient.      Past Medical History :  Active Ambulatory Problems     Diagnosis Date Noted   • GERD (gastroesophageal reflux disease)    • Anemia affecting pregnancy    • Encounter for initial prescription of contraceptive pills 11/01/2019   • Recurrent headache 07/01/2020   • Essential hypertension 07/01/2020   • Anxiety 07/01/2020   • Situational mixed anxiety and depressive disorder 07/02/2020   • Morbidly obese (CMS/HCC) 03/11/2021     Resolved Ambulatory Problems     Diagnosis Date Noted   • Post partum depression 11/01/2019     Past Medical History:   Diagnosis Date   • History of chicken pox        Medication List:    Current Outpatient Medications:   •  busPIRone (BUSPAR) 5 MG tablet, TAKE 1 TABLET  BY MOUTH THREE TIMES DAILY, Disp: 90 tablet, Rfl: 2  •  diphenoxylate-atropine (Lomotil) 2.5-0.025 MG per tablet, Take 1 tablet by mouth 4 (Four) Times a Day As Needed for Diarrhea., Disp: 12 tablet, Rfl: 0  •  fluticasone (FLONASE) 50 MCG/ACT nasal spray, 2 sprays into the nostril(s) as directed by provider Daily., Disp: 1 bottle, Rfl: 5  •  hydroCHLOROthiazide (HYDRODIURIL) 25 MG tablet, Take 1 tablet by mouth Daily., Disp: 30 tablet, Rfl: 5  •  Lidocaine Viscous HCl (XYLOCAINE) 2 % solution, Take 5 mL by mouth Every 3 (Three) Hours As Needed for Moderate Pain ., Disp: 100 mL, Rfl: 0  •  ondansetron ODT (ZOFRAN-ODT) 4 MG disintegrating tablet, Place 1 tablet on the tongue Every 8 (Eight) Hours As Needed for Nausea or Vomiting., Disp: 30 tablet, Rfl: 0  •  promethazine (PHENERGAN) 25 MG tablet, Take 1 tablet by mouth Every 6 (Six) Hours As Needed for Nausea or Vomiting., Disp: 30 tablet, Rfl: 0  •  SUMAtriptan (IMITREX) 50 MG tablet, Take 2 tablets by mouth 1 (One) Time As Needed for Migraine (Take one at onset of symptoms. May repeat in two hours (Maximum two per day).)., Disp: 9 tablet, Rfl: 5  •  venlafaxine XR (EFFEXOR-XR) 37.5 MG 24 hr capsule, Take 1 capsule by mouth Daily., Disp: 30 capsule, Rfl: 5    No Known Allergies    Social History     Tobacco Use   • Smoking status: Never Smoker   • Smokeless tobacco: Never Used   Substance Use Topics   • Alcohol use: Not on file       Review of Systems   Constitutional: Negative for chills and fever.   HENT: Negative for swollen glands.    Respiratory: Negative for shortness of breath.    Cardiovascular: Negative for chest pain.   Gastrointestinal: Positive for abdominal pain (low/mid abdomen, right side), change in bowel habit and hemorrhoids. Negative for abdominal distention, nausea and vomiting.   Genitourinary: Negative for difficulty urinating.   Skin: Negative for pallor.   Neurological: Negative for dizziness.         Objective   Vitals:    09/01/21 1411  "  BP: 142/90   Pulse: 91   Resp: 16   Temp: 98.2 °F (36.8 °C)   TempSrc: Temporal   SpO2: 96%   Weight: 105 kg (232 lb 6.4 oz)   Height: 167.6 cm (66\")     Body mass index is 37.51 kg/m².    Physical Exam  Constitutional:       Appearance: Normal appearance. She is obese. She is not ill-appearing.   HENT:      Head: Normocephalic and atraumatic.   Eyes:      General: No scleral icterus.     Conjunctiva/sclera: Conjunctivae normal.   Cardiovascular:      Rate and Rhythm: Normal rate and regular rhythm.   Pulmonary:      Effort: Pulmonary effort is normal.   Abdominal:      General: Bowel sounds are decreased. There is no distension.      Palpations: Abdomen is soft.      Tenderness: There is abdominal tenderness. There is no guarding or rebound.       Genitourinary:     Rectum: External hemorrhoid (thrombosed) present.   Musculoskeletal:      Right lower leg: No edema.      Left lower leg: No edema.   Neurological:      Mental Status: She is alert.   Psychiatric:         Mood and Affect: Mood normal.         Behavior: Behavior normal.         Thought Content: Thought content normal.         Assessment/Plan     Diagnoses and all orders for this visit:    1. Thrombosed external hemorrhoid (Primary)  -     hydrocortisone 2.5 % cream; Apply  topically to the appropriate area as directed 3 (Three) Times a Day.  Dispense: 30 g; Refill: 1    2. Essential hypertension  Comments:  Isolated elevation today.  Monitor.    3. Morbidly obese (CMS/HCC)    Start sitz baths 2-3 times daily, TUCKS wipes, and topical hydrocortisone cream.  BRAT diet, avoid constipation and straining.  Signs/symptoms of rupture discussed.  F/U in office if symptoms fail to improve.      Return if symptoms worsen or fail to improve.       Patient was given instructions and counseling regarding his/her condition or for health maintenance advice. Please see specific information pulled into the AVS if appropriate.     I wore protective equipment throughout " this patient encounter to include mask. Hand hygiene was performed before donning protective equipment and after removal when leaving the room.

## 2021-10-25 ENCOUNTER — TELEPHONE (OUTPATIENT)
Dept: FAMILY MEDICINE CLINIC | Facility: CLINIC | Age: 26
End: 2021-10-25

## 2021-10-25 DIAGNOSIS — K04.7 DENTAL INFECTION: Primary | ICD-10-CM

## 2021-10-25 RX ORDER — HYDROCODONE BITARTRATE AND ACETAMINOPHEN 5; 325 MG/1; MG/1
1 TABLET ORAL EVERY 6 HOURS PRN
Qty: 8 TABLET | Refills: 0 | Status: SHIPPED | OUTPATIENT
Start: 2021-10-25 | End: 2021-10-27

## 2021-10-25 NOTE — TELEPHONE ENCOUNTER
PATIENT IS HAVING TEETH PULLED ON Wednesday AND HAVING PAIN AND ABCESS ON THE RIGHT SIDE OF MOUTH WITH INFLAMMATION AND REDNESS      SHE HAS SOME LEFT OVER ANTIBIOTICS THAT WILL LAST HER UNTIL THE DENTAL APPT - CAN SHE TAKE THAT?     SHE WANTED TO KNOW IF THAT WAS OK TO TAKE AND ALSO  CAN YOU CALL IN SOMETHING FOR PAIN     PLEASE ADVISE   Louise Lopez (Self) [phone] (H)           [place] [id number] - [place], [place] - [address]  - [phone] [facility] [phone]   [phone]

## 2021-10-25 NOTE — TELEPHONE ENCOUNTER
amoxacillin 275 from her last tooth ache that will last time her appointment. Patient is requesting pain medication to help with the pain. Something that can last her two days.

## 2021-11-03 ENCOUNTER — TELEPHONE (OUTPATIENT)
Dept: FAMILY MEDICINE CLINIC | Facility: CLINIC | Age: 26
End: 2021-11-03

## 2021-11-03 NOTE — TELEPHONE ENCOUNTER
Caller: Louise Lopez    Relationship to patient: Self    Best call back number: [phone]     Patient is needing: PATIENT IS CALLING TO STATE SHE HAS BEEN ON AN ANTIBIOTIC SINCE LAST Thursday.  SHE STATES SHE STARTED WITH DIARRHEA THIS MORNING.  SHE IS WANTING TO KNOW WHAT DR. TAVAREZ RECOMMENDS.  THERE ARE NO AVAILABLE APPOINTMENTS UNTIL 11/08/21.    PLEASE ADVISE.    [facility] [address] [place] [address], [place] [address]  - [phone] [facility] [phone]   [phone]

## 2021-11-04 NOTE — TELEPHONE ENCOUNTER
Spoke with patient informed her to stop taking the antibiotic and start a probiotic Florastor or Culturelle. Also informed patient if things get worse to go to the ER.

## 2021-11-29 ENCOUNTER — TELEPHONE (OUTPATIENT)
Dept: FAMILY MEDICINE CLINIC | Facility: CLINIC | Age: 26
End: 2021-11-29

## 2021-11-29 NOTE — TELEPHONE ENCOUNTER
Caller: Louise Lopez    Relationship to patient: Self    Best call back number: [phone]    Chief complaint: CHEST PAIN, HEADACHE    Patient directed to call 911 or go to their nearest emergency room.     Patient verbalized understanding: [x] Yes  [] No  If no, why?    Additional notes: GOING TO [facility]

## 2021-11-29 NOTE — TELEPHONE ENCOUNTER
Called to check on the patient she states that she is having low back pain and chest pain. That started soon after court today patient is not sure is it is stress or what.     Told the patient to go to the ER and be seen.     Patient states that she will wait on her  to get home and go to the ER.

## 2021-11-30 ENCOUNTER — TELEPHONE (OUTPATIENT)
Dept: FAMILY MEDICINE CLINIC | Facility: CLINIC | Age: 26
End: 2021-11-30

## 2021-11-30 NOTE — TELEPHONE ENCOUNTER
Followed up with patient she went to [facility] ER yesterday and she is back in [facility] ER today because of the chest pain she is having. Yesterday they ruled out it wasn't her heart.

## 2021-11-30 NOTE — TELEPHONE ENCOUNTER
Patient called and said that the second time that she went to the ER they swabbed her for covid and it was positive. She wants to know what to expect and has some questions re: this. Please contact her at . Thanks Mamie

## 2021-11-30 NOTE — TELEPHONE ENCOUNTER
Spoke with Louise  she reports she was positve for covid today at ER   was give rx for cough. Just wanted to let office know. To call if any changes in symptoms.

## 2021-12-01 PROBLEM — U07.1 COVID-19 VIRUS INFECTION: Status: ACTIVE | Noted: 2021-11-30

## 2021-12-06 ENCOUNTER — OFFICE VISIT (OUTPATIENT)
Dept: FAMILY MEDICINE CLINIC | Facility: CLINIC | Age: 26
End: 2021-12-06

## 2021-12-06 DIAGNOSIS — Z91.199 NO-SHOW FOR APPOINTMENT: Primary | ICD-10-CM

## 2022-01-03 ENCOUNTER — TELEMEDICINE (OUTPATIENT)
Dept: FAMILY MEDICINE CLINIC | Facility: CLINIC | Age: 27
End: 2022-01-03

## 2022-01-03 DIAGNOSIS — K64.5 THROMBOSED EXTERNAL HEMORRHOID: Primary | ICD-10-CM

## 2022-01-03 DIAGNOSIS — R63.5 WEIGHT GAIN: ICD-10-CM

## 2022-01-03 DIAGNOSIS — Z13.220 SCREENING, LIPID: ICD-10-CM

## 2022-01-03 DIAGNOSIS — Z13.1 SCREENING FOR DIABETES MELLITUS: ICD-10-CM

## 2022-01-03 PROCEDURE — 99213 OFFICE O/P EST LOW 20 MIN: CPT | Performed by: FAMILY MEDICINE

## 2022-01-03 NOTE — PROGRESS NOTES
Chief Complaint   Patient presents with   • Hemorrhoids   • Med Management       Subjective   Louise Lopez is a 26 y.o. female.     You have chosen to receive care through a telehealth visit.  Do you consent to use a video/audio connection for your medical care today? Yes    Patient location: [place]  Physician location: [facility] [place]    Patient said that she currently has hemorrhoids due to diarrhea then having constipation. Patient does have hydrocortisone 2.5% cream and is needing refills. Patient is also needing to know if it is safe for her to take a probiotic and immune support gummy she got off of [employer] to help with weight loss.     Patient reports that she was not feeling well in the month of December.  Around Christmas eve, she developed diarrhea.  She started taking Imodium.  Her last bowel meant was 2 days ago.  She has since developed hemorrhoids.      Past Medical History :  Active Ambulatory Problems     Diagnosis Date Noted   • GERD (gastroesophageal reflux disease)    • Anemia affecting pregnancy    • Encounter for initial prescription of contraceptive pills 11/01/2019   • Recurrent headache 07/01/2020   • Essential hypertension 07/01/2020   • Anxiety 07/01/2020   • Situational mixed anxiety and depressive disorder 07/02/2020   • Morbidly obese (HCC) 03/11/2021   • COVID-19 virus infection 11/30/2021     Resolved Ambulatory Problems     Diagnosis Date Noted   • Post partum depression 11/01/2019     Past Medical History:   Diagnosis Date   • History of chicken pox        Medication List:    Current Outpatient Medications:   •  hydrocortisone 2.5 % cream, Apply  topically to the appropriate area as directed 3 (Three) Times a Day., Disp: 30 g, Rfl: 2    No Known Allergies    Social History     Tobacco Use   • Smoking status: Never Smoker   • Smokeless tobacco: Never Used   Substance Use Topics   • Alcohol use: Not on file         Review of Systems   Constitutional:  Positive for unexpected weight gain (Inability to lose weight). Negative for fever.   Eyes: Negative for visual disturbance.   Respiratory: Negative for shortness of breath.    Cardiovascular: Negative for chest pain.   Gastrointestinal: Positive for anal bleeding (Hemorrhoid). Negative for vomiting.   Genitourinary: Negative for decreased urine volume, difficulty urinating and dysuria.   Neurological: Positive for headache. Negative for syncope and weakness.         Objective     Physical Exam  Constitutional:       General: She is not in acute distress.     Appearance: She is well-developed.   HENT:      Head: Normocephalic and atraumatic.   Eyes:      General: No scleral icterus.        Right eye: No discharge.         Left eye: No discharge.      Conjunctiva/sclera: Conjunctivae normal.   Pulmonary:      Effort: Pulmonary effort is normal.   Neurological:      Mental Status: She is alert and oriented to person, place, and time.   Psychiatric:         Behavior: Behavior normal.         Thought Content: Thought content normal.         Judgment: Judgment normal.         Assessment/Plan     Diagnoses and all orders for this visit:    1. Thrombosed external hemorrhoid (Primary)  -     hydrocortisone 2.5 % cream; Apply  topically to the appropriate area as directed 3 (Three) Times a Day.  Dispense: 30 g; Refill: 2    2. Weight gain  -     Comprehensive Metabolic Panel  -     TSH  -     CBC & Differential    3. Screening for diabetes mellitus  -     Hemoglobin A1c    4. Screening, lipid  -     Lipid Panel      Refill of hydrocortisone cream provided.  We reviewed her supplements together, and the supplements appears to be safe.  I did recommend if she were going to take the supplements for more than 3 months, to have blood work done 3 months into the treatment course to ensure blood cells, kidney function, and liver enzymes are stable.  She agrees.  Order placed.    All questions presented by patient addressed.    No  follow-ups on file.

## 2022-01-24 ENCOUNTER — OFFICE VISIT (OUTPATIENT)
Dept: FAMILY MEDICINE CLINIC | Facility: CLINIC | Age: 27
End: 2022-01-24

## 2022-01-24 ENCOUNTER — TELEPHONE (OUTPATIENT)
Dept: FAMILY MEDICINE CLINIC | Facility: CLINIC | Age: 27
End: 2022-01-24

## 2022-01-24 VITALS
RESPIRATION RATE: 18 BRPM | TEMPERATURE: 97.4 F | HEIGHT: 66 IN | HEART RATE: 82 BPM | SYSTOLIC BLOOD PRESSURE: 136 MMHG | DIASTOLIC BLOOD PRESSURE: 74 MMHG | BODY MASS INDEX: 35.03 KG/M2 | WEIGHT: 218 LBS | OXYGEN SATURATION: 100 %

## 2022-01-24 DIAGNOSIS — F32.9 REACTIVE DEPRESSION: ICD-10-CM

## 2022-01-24 DIAGNOSIS — R63.4 WEIGHT LOSS, UNINTENTIONAL: ICD-10-CM

## 2022-01-24 DIAGNOSIS — F43.0 ACUTE STRESS REACTION: ICD-10-CM

## 2022-01-24 DIAGNOSIS — E66.9 OBESITY (BMI 30-39.9): ICD-10-CM

## 2022-01-24 DIAGNOSIS — R10.13 EPIGASTRIC PAIN: Primary | ICD-10-CM

## 2022-01-24 DIAGNOSIS — L30.9 HAND DERMATITIS: ICD-10-CM

## 2022-01-24 LAB
B-HCG UR QL: NEGATIVE
BILIRUB BLD-MCNC: NEGATIVE MG/DL
CLARITY, POC: CLEAR
COLOR UR: ABNORMAL
EXPIRATION DATE: NORMAL
GLUCOSE UR STRIP-MCNC: NEGATIVE MG/DL
INTERNAL NEGATIVE CONTROL: NORMAL
INTERNAL POSITIVE CONTROL: NORMAL
KETONES UR QL: NEGATIVE
LEUKOCYTE EST, POC: NEGATIVE
Lab: NORMAL
NITRITE UR-MCNC: NEGATIVE MG/ML
PH UR: 5 [PH] (ref 5–8)
PROT UR STRIP-MCNC: ABNORMAL MG/DL
RBC # UR STRIP: ABNORMAL /UL
SP GR UR: 1.03 (ref 1–1.03)
UROBILINOGEN UR QL: NORMAL

## 2022-01-24 PROCEDURE — 99214 OFFICE O/P EST MOD 30 MIN: CPT | Performed by: FAMILY MEDICINE

## 2022-01-24 PROCEDURE — 81025 URINE PREGNANCY TEST: CPT | Performed by: FAMILY MEDICINE

## 2022-01-24 RX ORDER — SUCRALFATE 1 G/1
1 TABLET ORAL 4 TIMES DAILY
Qty: 120 TABLET | Refills: 0 | Status: SHIPPED | OUTPATIENT
Start: 2022-01-24

## 2022-01-24 RX ORDER — SERTRALINE HYDROCHLORIDE 25 MG/1
25 TABLET, FILM COATED ORAL DAILY
Qty: 30 TABLET | Refills: 5 | Status: SHIPPED | OUTPATIENT
Start: 2022-01-24 | End: 2022-03-14

## 2022-01-24 RX ORDER — OMEPRAZOLE 20 MG/1
20 CAPSULE, DELAYED RELEASE ORAL DAILY
Qty: 30 CAPSULE | Refills: 5 | Status: SHIPPED | OUTPATIENT
Start: 2022-01-24

## 2022-01-24 NOTE — PROGRESS NOTES
Chief Complaint   Patient presents with   • Side Pain   • Rib Pain       Subjective   Louise Lopez is a 26 y.o. female.     Rib Pain:   Onset was 6 day(s) ago.. Mechanism of injury was none known. Pain is located lateral chest wall: bilaterally and anterior chest wall: bilaterally.Symptoms are described as Moderate and unchanged. Associated symptoms include chest pain. Previous treatment includes none.         Abdominal Pain  This is a new problem. The current episode started in the past 7 days. The onset quality is gradual. The problem occurs daily. The problem has been unchanged. The pain is located in the epigastric region. The pain is at a severity of 7/10. The pain is moderate. The quality of the pain is aching and cramping. The abdominal pain radiates to the back, RUQ and LUQ (sternum). Associated symptoms include anorexia, flatus, nausea and weight loss. Pertinent negatives include no constipation, diarrhea, dysuria, fever, hematochezia, melena or vomiting. The pain is aggravated by eating (loss of appetite - has been losing weight). Relieved by: fasting. She has tried nothing (has not tried any OTC medications) for the symptoms. Prior workup: was in ER at [facility] 2 days ago.  She reports having an u/s on her lower abdomen. There is no history of Crohn's disease, gallstones, GERD, pancreatitis, PUD or ulcerative colitis.   Anxiety  Presents for initial visit. Onset was at an unknown time (months). The problem has been gradually worsening. Symptoms include depressed mood, excessive worry and nausea. Patient reports no chest pain, dizziness, palpitations, shortness of breath or suicidal ideas. Symptoms occur constantly. The severity of symptoms is causing significant distress. The symptoms are aggravated by family issues.     Risk factors include family history, a major life event and marital problems. Past treatments include nothing.   Rash  This is a new problem. The current episode started more than 1 month  ago. The problem has been waxing and waning since onset. The affected locations include the left hand and right hand. The rash is characterized by dryness, itchiness, pain and redness. Associated with: wears gloves at work, sweaty hands, frequent hand washing. Associated symptoms include anorexia. Pertinent negatives include no cough, diarrhea, fever, shortness of breath or vomiting. Past treatments include anti-itch cream and moisturizer. The treatment provided no relief.      No prior history of ulcer.  No h/o gallstones or cholecystitis.        Past Medical History :  Active Ambulatory Problems     Diagnosis Date Noted   • GERD (gastroesophageal reflux disease)    • Anemia affecting pregnancy    • Encounter for initial prescription of contraceptive pills 11/01/2019   • Recurrent headache 07/01/2020   • Essential hypertension 07/01/2020   • Anxiety 07/01/2020   • Situational mixed anxiety and depressive disorder 07/02/2020   • Morbidly obese (HCC) 03/11/2021   • COVID-19 virus infection 11/30/2021     Resolved Ambulatory Problems     Diagnosis Date Noted   • Post partum depression 11/01/2019     Past Medical History:   Diagnosis Date   • History of chicken pox        Medication List:    Current Outpatient Medications:   •  hydrocortisone 2.5 % cream, Apply  topically to the appropriate area as directed 3 (Three) Times a Day., Disp: 30 g, Rfl: 2    No Known Allergies    Social History     Tobacco Use   • Smoking status: Never Smoker   • Smokeless tobacco: Never Used   Substance Use Topics   • Alcohol use: Not on file       PHQ-9 Depression Screening  Little interest or pleasure in doing things? 2   Feeling down, depressed, or hopeless? 3   Trouble falling or staying asleep, or sleeping too much? 2   Feeling tired or having little energy? 2   Poor appetite or overeating? 3   Feeling bad about yourself - or that you are a failure or have let yourself or your family down? 1   Trouble concentrating on things, such as  "reading the newspaper or watching television? 0   Moving or speaking so slowly that other people could have noticed? Or the opposite - being so fidgety or restless that you have been moving around a lot more than usual? 0   Thoughts that you would be better off dead, or of hurting yourself in some way? 0   PHQ-9 Total Score 13   If you checked off any problems, how difficult have these problems made it for you to do your work, take care of things at home, or get along with other people? Not difficult at all         Review of Systems   Constitutional: Positive for appetite change (loss) and unexpected weight loss. Negative for fever.   HENT: Negative for trouble swallowing.    Eyes: Negative for visual disturbance.   Respiratory: Negative for cough, shortness of breath and wheezing.    Cardiovascular: Negative for chest pain, palpitations and leg swelling.   Gastrointestinal: Positive for abdominal pain, anorexia, flatus and nausea. Negative for constipation, diarrhea, hematochezia, melena and vomiting.   Endocrine: Negative for polydipsia and polyuria.   Genitourinary: Positive for menstrual problem (heavier menses). Negative for dysuria.   Skin: Positive for rash (hands).   Neurological: Negative for dizziness, seizures, syncope and facial asymmetry.   Psychiatric/Behavioral: Positive for depressed mood and stress (marital and financial trouble). Negative for suicidal ideas.         Objective   Vitals:    01/24/22 1105   BP: 136/74   BP Location: Right arm   Patient Position: Sitting   Cuff Size: Adult   Pulse: 82   Resp: 18   Temp: 97.4 °F (36.3 °C)   TempSrc: Temporal   SpO2: 100%   Weight: 98.9 kg (218 lb)   Height: 167.6 cm (66\")     Body mass index is 35.19 kg/m².    Physical Exam  Constitutional:       General: She is not in acute distress.     Appearance: Normal appearance. She is well-developed.   HENT:      Head: Normocephalic and atraumatic.   Eyes:      General: No scleral icterus.        Right eye: No " discharge.         Left eye: No discharge.      Extraocular Movements: Extraocular movements intact.      Conjunctiva/sclera: Conjunctivae normal.   Cardiovascular:      Rate and Rhythm: Normal rate and regular rhythm.      Heart sounds: Normal heart sounds. No murmur heard.      Pulmonary:      Effort: Pulmonary effort is normal.      Breath sounds: Normal breath sounds.   Abdominal:      Palpations: Abdomen is soft.      Tenderness: There is abdominal tenderness in the left upper quadrant. There is no guarding or rebound.       Musculoskeletal:         General: No swelling.      Cervical back: Normal range of motion and neck supple.      Right lower leg: No edema.      Left lower leg: No edema.   Skin:     General: Skin is warm.      Capillary Refill: Capillary refill takes less than 2 seconds.      Findings: No rash.   Neurological:      General: No focal deficit present.      Mental Status: She is alert.      Cranial Nerves: No cranial nerve deficit.       Brief Urine Lab Results  (Last result in the past 365 days)      Color   Clarity   Blood   Leuk Est   Nitrite   Protein   CREAT   Urine HCG        01/24/22 1146               Negative       01/24/22 1146 Red   Clear   3+   Negative   Negative   3+               She is on her menstrual cycle.      Assessment/Plan     Diagnoses and all orders for this visit:    1. Epigastric pain (Primary)  -     POC Urinalysis Dipstick, Multipro  -     POCT pregnancy, urine  -     H. Pylori Breath Test - Breath, Lung  -     omeprazole (priLOSEC) 20 MG capsule; Take 1 capsule by mouth Daily.  Dispense: 30 capsule; Refill: 5  -     sucralfate (Carafate) 1 g tablet; Take 1 tablet by mouth 4 (Four) Times a Day. Take 30 minutes before eating and at bedtime.  Dispense: 120 tablet; Refill: 0    2. Acute stress reaction  -     sertraline (ZOLOFT) 25 MG tablet; Take 1 tablet by mouth Daily.  Dispense: 30 tablet; Refill: 5    3. Reactive depression  -     sertraline (ZOLOFT) 25 MG  tablet; Take 1 tablet by mouth Daily.  Dispense: 30 tablet; Refill: 5    4. Hand dermatitis  Comments:  Use hydrocortisone 2.5% cream (has at home).  Moisturize.  Change hand soaps if able.    5. Weight loss, unintentional  Comments:  Lab order re-printed to check CBC, CMP, TSH, a1c and lipids.    6. Obesity (BMI 30-39.9)        Medication and medication adverse effects discussed.  Drug education given and explained to patient. Patient verbalized understanding.  All questions presented by patient addressed.    No follow-ups on file.       Patient was given instructions and counseling regarding his/her condition or for health maintenance advice. Please see specific information pulled into the AVS if appropriate.     I wore protective equipment throughout this patient encounter to include mask. Hand hygiene was performed before donning protective equipment and after removal when leaving the room.

## 2022-01-24 NOTE — TELEPHONE ENCOUNTER
Called [facility] and spoke with medical records and they can't find anything in the system under either name or where the patient was seen there on Saturday at all under either name.

## 2022-01-25 LAB
ALBUMIN SERPL-MCNC: 4.5 G/DL (ref 3.9–5)
ALBUMIN/GLOB SERPL: 1.2 {RATIO} (ref 1.2–2.2)
ALP SERPL-CCNC: 62 IU/L (ref 44–121)
ALT SERPL-CCNC: 15 IU/L (ref 0–32)
AST SERPL-CCNC: 13 IU/L (ref 0–40)
BASOPHILS # BLD AUTO: 0 X10E3/UL (ref 0–0.2)
BASOPHILS NFR BLD AUTO: 1 %
BILIRUB SERPL-MCNC: 0.3 MG/DL (ref 0–1.2)
BUN SERPL-MCNC: 12 MG/DL (ref 6–20)
BUN/CREAT SERPL: 15 (ref 9–23)
CALCIUM SERPL-MCNC: 9.5 MG/DL (ref 8.7–10.2)
CHLORIDE SERPL-SCNC: 107 MMOL/L (ref 96–106)
CHOLEST SERPL-MCNC: 151 MG/DL (ref 100–199)
CO2 SERPL-SCNC: 21 MMOL/L (ref 20–29)
CREAT SERPL-MCNC: 0.79 MG/DL (ref 0.57–1)
EOSINOPHIL # BLD AUTO: 0.1 X10E3/UL (ref 0–0.4)
EOSINOPHIL NFR BLD AUTO: 1 %
ERYTHROCYTE [DISTWIDTH] IN BLOOD BY AUTOMATED COUNT: 14.2 % (ref 11.7–15.4)
GLOBULIN SER CALC-MCNC: 3.9 G/DL (ref 1.5–4.5)
GLUCOSE SERPL-MCNC: 80 MG/DL (ref 65–99)
HBA1C MFR BLD: 5.3 % (ref 4.8–5.6)
HCT VFR BLD AUTO: 33.9 % (ref 34–46.6)
HDLC SERPL-MCNC: 39 MG/DL
HGB BLD-MCNC: 11.4 G/DL (ref 11.1–15.9)
IMM GRANULOCYTES # BLD AUTO: 0 X10E3/UL (ref 0–0.1)
IMM GRANULOCYTES NFR BLD AUTO: 0 %
LDLC SERPL CALC-MCNC: 90 MG/DL (ref 0–99)
LYMPHOCYTES # BLD AUTO: 2.1 X10E3/UL (ref 0.7–3.1)
LYMPHOCYTES NFR BLD AUTO: 24 %
MCH RBC QN AUTO: 28.2 PG (ref 26.6–33)
MCHC RBC AUTO-ENTMCNC: 33.6 G/DL (ref 31.5–35.7)
MCV RBC AUTO: 84 FL (ref 79–97)
MONOCYTES # BLD AUTO: 0.3 X10E3/UL (ref 0.1–0.9)
MONOCYTES NFR BLD AUTO: 4 %
NEUTROPHILS # BLD AUTO: 6.3 X10E3/UL (ref 1.4–7)
NEUTROPHILS NFR BLD AUTO: 70 %
PLATELET # BLD AUTO: 313 X10E3/UL (ref 150–450)
POTASSIUM SERPL-SCNC: 4.1 MMOL/L (ref 3.5–5.2)
PROT SERPL-MCNC: 8.4 G/DL (ref 6–8.5)
RBC # BLD AUTO: 4.04 X10E6/UL (ref 3.77–5.28)
SODIUM SERPL-SCNC: 143 MMOL/L (ref 134–144)
TRIGL SERPL-MCNC: 124 MG/DL (ref 0–149)
TSH SERPL-ACNC: 0.73 UIU/ML (ref 0.45–4.5)
VLDLC SERPL CALC-MCNC: 22 MG/DL (ref 5–40)
WBC # BLD AUTO: 8.8 X10E3/UL (ref 3.4–10.8)

## 2022-01-27 ENCOUNTER — TELEPHONE (OUTPATIENT)
Dept: FAMILY MEDICINE CLINIC | Facility: CLINIC | Age: 27
End: 2022-01-27

## 2022-01-27 DIAGNOSIS — R19.7 DIARRHEA, UNSPECIFIED TYPE: Primary | ICD-10-CM

## 2022-01-27 RX ORDER — DICYCLOMINE HYDROCHLORIDE 10 MG/1
10 CAPSULE ORAL
Qty: 20 CAPSULE | Refills: 0 | Status: SHIPPED | OUTPATIENT
Start: 2022-01-27

## 2022-01-27 NOTE — TELEPHONE ENCOUNTER
PATIENT STATES SHE WAS SUPPOSED TO HEAR BACK FROM DR. TAVAREZ WHEN HER RESULTS CAME IN AND SHE SEEN THOSE Tuesday ON HER MYCHART AND STILL HASN'T GOTTEN A CALL, SHE WAS WAITING ON HER LAB RESULTS BEFORE SHE CALLED IN AN ANTIBIOTIC... ALSO PATIENT WAS TOLD TO CALL BACK IF SHE HAD ANY WORSENING OR NEW SYMPTOMS. SHE DID START HAVING DIARRHEA THIS MORNING AND THE STOMACH PAIN IS GETTING WORSE. SHE REALLY DOESN'T WANT TO GO INTO ANOTHER MISERABLE WEEKEND. PLEASE CALL [facility].     PATIENT> [phone]    [facility] - [place], [place] - [address] - [phone]  - [phone]   [phone]

## 2022-01-27 NOTE — TELEPHONE ENCOUNTER
The test that determines if she needs an antibiotic or not is still pending (H pylori breath test).  The rest of her labs look good.    I can send in some dicyclomine for abdominal pain and diarrhea.  Recommend going back to ER this weekend if she gets worse.

## 2022-02-22 ENCOUNTER — TELEPHONE (OUTPATIENT)
Dept: FAMILY MEDICINE CLINIC | Facility: CLINIC | Age: 27
End: 2022-02-22

## 2022-02-22 NOTE — TELEPHONE ENCOUNTER
Caller: Louise Lopez    Relationship: Self    Best call back number: [phone]    What medication are you requesting: AMOXICYLLIN    What are your current symptoms: TOOTH ACHE    How long have you been experiencing symptoms:     Have you had these symptoms before:    [x] Yes  [] No    Have you been treated for these symptoms before:   [x] Yes  [] No    If a prescription is needed, what is your preferred pharmacy and phone number:      [facility] [place] [facility] [address]  - [phone] [facility] [phone]        Additional notes: PATIENT NEEDS ENOUGH TO GET HER TO THE APPOINTMENT TO GET HER TOOTH PULLED ON 3/4

## 2022-02-23 RX ORDER — AMOXICILLIN 500 MG/1
500 CAPSULE ORAL 3 TIMES DAILY
Qty: 30 CAPSULE | Refills: 0 | Status: SHIPPED | OUTPATIENT
Start: 2022-02-23 | End: 2022-03-05

## 2022-03-14 ENCOUNTER — OFFICE VISIT (OUTPATIENT)
Dept: FAMILY MEDICINE CLINIC | Facility: CLINIC | Age: 27
End: 2022-03-14

## 2022-03-14 VITALS
WEIGHT: 222.38 LBS | HEIGHT: 66 IN | TEMPERATURE: 98.2 F | RESPIRATION RATE: 18 BRPM | OXYGEN SATURATION: 100 % | SYSTOLIC BLOOD PRESSURE: 124 MMHG | HEART RATE: 95 BPM | BODY MASS INDEX: 35.74 KG/M2 | DIASTOLIC BLOOD PRESSURE: 80 MMHG

## 2022-03-14 DIAGNOSIS — U07.1 COVID-19 VIRUS INFECTION: Primary | ICD-10-CM

## 2022-03-14 PROCEDURE — 99212 OFFICE O/P EST SF 10 MIN: CPT | Performed by: FAMILY MEDICINE

## 2022-06-10 ENCOUNTER — OFFICE VISIT (OUTPATIENT)
Dept: FAMILY MEDICINE CLINIC | Facility: CLINIC | Age: 27
End: 2022-06-10

## 2022-06-10 ENCOUNTER — HOSPITAL ENCOUNTER (OUTPATIENT)
Dept: GENERAL RADIOLOGY | Facility: HOSPITAL | Age: 27
Discharge: HOME | End: 2022-06-10
Admitting: FAMILY MEDICINE

## 2022-06-10 VITALS
WEIGHT: 219.2 LBS | TEMPERATURE: 97.3 F | DIASTOLIC BLOOD PRESSURE: 64 MMHG | RESPIRATION RATE: 16 BRPM | HEIGHT: 66 IN | BODY MASS INDEX: 35.23 KG/M2 | HEART RATE: 72 BPM | OXYGEN SATURATION: 97 % | SYSTOLIC BLOOD PRESSURE: 112 MMHG

## 2022-06-10 DIAGNOSIS — Z11.3 SCREEN FOR STD (SEXUALLY TRANSMITTED DISEASE): ICD-10-CM

## 2022-06-10 DIAGNOSIS — E66.01 MORBIDLY OBESE: ICD-10-CM

## 2022-06-10 DIAGNOSIS — N30.01 ACUTE CYSTITIS WITH HEMATURIA: Primary | ICD-10-CM

## 2022-06-10 DIAGNOSIS — R31.0 GROSS HEMATURIA: ICD-10-CM

## 2022-06-10 LAB
B-HCG UR QL: NEGATIVE
BILIRUB BLD-MCNC: ABNORMAL MG/DL
CLARITY, POC: ABNORMAL
COLOR UR: YELLOW
EXPIRATION DATE: NORMAL
GLUCOSE UR STRIP-MCNC: NEGATIVE MG/DL
INTERNAL NEGATIVE CONTROL: NEGATIVE
INTERNAL POSITIVE CONTROL: POSITIVE
KETONES UR QL: ABNORMAL
LEUKOCYTE EST, POC: ABNORMAL
Lab: NORMAL
NITRITE UR-MCNC: NEGATIVE MG/ML
PH UR: 5 [PH] (ref 5–8)
PROT UR STRIP-MCNC: ABNORMAL MG/DL
RBC # UR STRIP: ABNORMAL /UL
SP GR UR: 1.02 (ref 1–1.03)
UROBILINOGEN UR QL: NORMAL

## 2022-06-10 PROCEDURE — 74018 RADEX ABDOMEN 1 VIEW: CPT

## 2022-06-10 PROCEDURE — 81025 URINE PREGNANCY TEST: CPT | Performed by: FAMILY MEDICINE

## 2022-06-10 PROCEDURE — 99214 OFFICE O/P EST MOD 30 MIN: CPT | Performed by: FAMILY MEDICINE

## 2022-06-10 RX ORDER — SULFAMETHOXAZOLE AND TRIMETHOPRIM 800; 160 MG/1; MG/1
1 TABLET ORAL 2 TIMES DAILY
Qty: 14 TABLET | Refills: 0 | Status: SHIPPED | OUTPATIENT
Start: 2022-06-10 | End: 2022-06-17

## 2022-06-10 NOTE — PROGRESS NOTES
Chief Complaint   Patient presents with   • Urinary Tract Infection       Subjective   Louise Lopez is a 26 y.o. female.     Urinary Tract Infection   This is a new problem. The current episode started in the past 7 days. The problem has been gradually worsening. The quality of the pain is described as shooting and stabbing (sharp). The pain is at a severity of 3/10 (to 9). The pain is mild (to severe). There has been no fever. She is not sexually active. There is no history of pyelonephritis. Associated symptoms include flank pain (left), frequency, hesitancy, nausea and urgency. Pertinent negatives include no possible pregnancy or vomiting. Treatments tried: Midol, Advil. The treatment provided mild relief.        Past Medical History :  Active Ambulatory Problems     Diagnosis Date Noted   • GERD (gastroesophageal reflux disease)    • Anemia affecting pregnancy    • Encounter for initial prescription of contraceptive pills 11/01/2019   • Recurrent headache 07/01/2020   • Essential hypertension 07/01/2020   • Anxiety 07/01/2020   • Situational mixed anxiety and depressive disorder 07/02/2020   • Morbidly obese (HCC) 03/11/2021   • COVID-19 virus infection 11/30/2021     Resolved Ambulatory Problems     Diagnosis Date Noted   • Post partum depression 11/01/2019     Past Medical History:   Diagnosis Date   • History of chicken pox        Medication List:    Current Outpatient Medications:   •  dicyclomine (Bentyl) 10 MG capsule, Take 1 capsule by mouth 4 (Four) Times a Day Before Meals & at Bedtime. Take if needed for abdominal pain and diarrhea., Disp: 20 capsule, Rfl: 0  •  hydrocortisone 2.5 % cream, Apply  topically to the appropriate area as directed 3 (Three) Times a Day., Disp: 30 g, Rfl: 2  •  omeprazole (priLOSEC) 20 MG capsule, Take 1 capsule by mouth Daily., Disp: 30 capsule, Rfl: 5  •  sucralfate (Carafate) 1 g tablet, Take 1 tablet by mouth 4 (Four) Times a Day. Take 30 minutes before eating and at  "bedtime., Disp: 120 tablet, Rfl: 0    No Known Allergies    Social History     Tobacco Use   • Smoking status: Never Smoker   • Smokeless tobacco: Never Used   Substance Use Topics   • Alcohol use: Not on file       Review of Systems   Gastrointestinal: Positive for abdominal pain and nausea. Negative for blood in stool, constipation, diarrhea and vomiting.   Genitourinary: Positive for flank pain (left), frequency, hesitancy, pelvic pain and urgency. Negative for dysuria and vaginal discharge.         Objective   Vitals:    06/10/22 1437   BP: 112/64   BP Location: Right arm   Patient Position: Sitting   Cuff Size: Large Adult   Pulse: 72   Resp: 16   Temp: 97.3 °F (36.3 °C)   TempSrc: Skin   SpO2: 97%   Weight: 99.4 kg (219 lb 3.2 oz)   Height: 167.6 cm (66\")     Body mass index is 35.38 kg/m².    Physical Exam  Constitutional:       Appearance: She is well-developed.   HENT:      Head: Normocephalic and atraumatic.   Eyes:      General: No scleral icterus.     Conjunctiva/sclera: Conjunctivae normal.   Cardiovascular:      Rate and Rhythm: Normal rate and regular rhythm.      Heart sounds: Normal heart sounds.   Pulmonary:      Effort: Pulmonary effort is normal.      Breath sounds: Normal breath sounds.   Abdominal:      General: Bowel sounds are normal. There is no distension.      Palpations: Abdomen is soft.      Tenderness: There is no guarding or rebound.   Musculoskeletal:      Cervical back: Normal range of motion and neck supple. No edema.   Skin:     General: Skin is warm.      Capillary Refill: Capillary refill takes less than 2 seconds.      Findings: No rash.   Neurological:      Mental Status: She is alert.      Cranial Nerves: No cranial nerve deficit.   Psychiatric:         Behavior: Behavior normal.         Thought Content: Thought content normal.         Judgment: Judgment normal.       XR Abdomen KUB    Result Date: 6/10/2022  Negative KUB. No radiopaque urinary tract stone is identified.  " Electronically Signed By-Cira Gaitan MD On:6/10/2022 3:56 PM This report was finalized on [id number] by  Cira Gaitan MD.      Chlamydia trachomatis, Neisseria gonorrhoeae, PCR - Urine, Urine, Clean Catch    Collection Time: 06/10/22 12:00 AM    Specimen: Urine, Clean Catch    Urine  Release to birgit   Result Value Ref Range    Chlamydia trachomatis, GURPREET Negative Negative    Neisseria gonorrhoeae, GURPREET Negative Negative   POCT urinalysis dipstick, manual    Collection Time: 06/10/22  2:44 PM    Specimen: Urine   Result Value Ref Range    Color Yellow Yellow, Straw, Dark Yellow, Sara    Clarity, UA Cloudy (A) Clear    Glucose, UA Negative Negative, 1000 mg/dL (3+) mg/dL    Bilirubin Small (1+) (A) Negative    Ketones, UA Trace (A) Negative    Specific Gravity  1.020 1.005 - 1.030    Blood, UA 3+ (A) Negative    pH, Urine 5.0 5.0 - 8.0    Protein, POC 2+ (A) Negative mg/dL    Urobilinogen, UA Normal Normal    Leukocytes Moderate (2+) (A) Negative    Nitrite, UA Negative Negative   POCT pregnancy, urine    Collection Time: 06/10/22  2:56 PM    Specimen: Urine   Result Value Ref Range    HCG, Urine, QL Negative Negative    Lot Number [id number]     Internal Positive Control Positive Positive, Passed    Internal Negative Control Negative Negative, Passed    Expiration Date 05-          Assessment & Plan     Diagnoses and all orders for this visit:    1. Acute cystitis with hematuria (Primary)  -     POCT urinalysis dipstick, manual  -     POCT pregnancy, urine  -     Urine Culture - Urine, Urine, Random Void  -     sulfamethoxazole-trimethoprim (Bactrim DS) 800-160 MG per tablet; Take 1 tablet by mouth 2 (Two) Times a Day for 7 days.  Dispense: 14 tablet; Refill: 0    2. Gross hematuria  -     XR Abdomen KUB    3. Screen for STD (sexually transmitted disease)  -     Chlamydia trachomatis, Neisseria gonorrhoeae, PCR - Urine, Urine, Clean Catch    4. Morbidly obese (HCC)      No follow-ups on file.        Patient was given instructions and counseling regarding his/her condition or for health maintenance advice. Please see specific information pulled into the AVS if appropriate.     I wore protective equipment throughout this patient encounter to include mask. Hand hygiene was performed before donning protective equipment and after removal when leaving the room.

## 2022-06-12 LAB
BACTERIA UR CULT: NORMAL
BACTERIA UR CULT: NORMAL

## 2022-06-12 NOTE — PROGRESS NOTES
Please let patient know that her urine is not showing any growth of bacteria.  If her symptoms continue despite the antibiotic, then she needs to let me know.

## 2022-06-13 LAB
C TRACH RRNA SPEC QL NAA+PROBE: NEGATIVE
N GONORRHOEA RRNA SPEC QL NAA+PROBE: NEGATIVE

## 2022-07-13 ENCOUNTER — OFFICE VISIT (OUTPATIENT)
Dept: FAMILY MEDICINE CLINIC | Facility: CLINIC | Age: 27
End: 2022-07-13

## 2022-07-13 VITALS
BODY MASS INDEX: 35.71 KG/M2 | WEIGHT: 222.2 LBS | HEIGHT: 66 IN | SYSTOLIC BLOOD PRESSURE: 120 MMHG | HEART RATE: 94 BPM | RESPIRATION RATE: 16 BRPM | TEMPERATURE: 97.5 F | OXYGEN SATURATION: 99 % | DIASTOLIC BLOOD PRESSURE: 64 MMHG

## 2022-07-13 DIAGNOSIS — E66.9 OBESITY, CLASS II, BMI 35-39.9: ICD-10-CM

## 2022-07-13 DIAGNOSIS — M54.50 ACUTE LEFT-SIDED LOW BACK PAIN WITHOUT SCIATICA: Primary | ICD-10-CM

## 2022-07-13 DIAGNOSIS — N39.0 RECURRENT UTI: Chronic | ICD-10-CM

## 2022-07-13 LAB
B-HCG UR QL: NEGATIVE
BILIRUB BLD-MCNC: ABNORMAL MG/DL
CLARITY, POC: ABNORMAL
COLOR UR: YELLOW
EXPIRATION DATE: NORMAL
GLUCOSE UR STRIP-MCNC: NEGATIVE MG/DL
INTERNAL NEGATIVE CONTROL: NEGATIVE
INTERNAL POSITIVE CONTROL: POSITIVE
KETONES UR QL: ABNORMAL
LEUKOCYTE EST, POC: ABNORMAL
Lab: NORMAL
NITRITE UR-MCNC: NEGATIVE MG/ML
PH UR: 5 [PH] (ref 5–8)
PROT UR STRIP-MCNC: ABNORMAL MG/DL
RBC # UR STRIP: ABNORMAL /UL
SP GR UR: 1.01 (ref 1–1.03)
UROBILINOGEN UR QL: NORMAL

## 2022-07-13 PROCEDURE — 99214 OFFICE O/P EST MOD 30 MIN: CPT | Performed by: FAMILY MEDICINE

## 2022-07-13 RX ORDER — IBUPROFEN 800 MG/1
800 TABLET ORAL EVERY 8 HOURS PRN
Qty: 30 TABLET | Refills: 1 | Status: SHIPPED | OUTPATIENT
Start: 2022-07-13

## 2022-07-13 RX ORDER — METHOCARBAMOL 750 MG/1
750 TABLET, FILM COATED ORAL 4 TIMES DAILY
Qty: 28 TABLET | Refills: 1 | Status: SHIPPED | OUTPATIENT
Start: 2022-07-13

## 2022-07-13 RX ORDER — CIPROFLOXACIN 500 MG/1
500 TABLET, FILM COATED ORAL 2 TIMES DAILY
COMMUNITY
Start: 2022-07-09 | End: 2022-07-13

## 2022-07-13 RX ORDER — METHYLPREDNISOLONE 4 MG/1
TABLET ORAL
Qty: 21 TABLET | Refills: 0 | Status: SHIPPED | OUTPATIENT
Start: 2022-07-13

## 2022-07-13 NOTE — PROGRESS NOTES
Chief Complaint   Patient presents with   • Urinary Tract Infection   • Back Pain       Subjective   Louise Luis is a 26 y.o. female.     Back Pain  This is a new problem. The current episode started 1 to 4 weeks ago. The problem occurs daily. The problem has been waxing and waning since onset. The pain is present in the lumbar spine. The quality of the pain is described as aching. The pain does not radiate. The pain is at a severity of 9/10. The pain is severe. The pain is the same all the time. The symptoms are aggravated by twisting, sitting, standing, bending, lying down and position. Stiffness is present in the morning. Associated symptoms include numbness (bilateral arms) and tingling (bilateral arms). Pertinent negatives include no fever or paresthesias. She has tried ice and heat (Diclofenac) for the symptoms. The treatment provided no relief.   Urinary Tract Infection   This is a new problem. The current episode started in the past 7 days. The problem has been rapidly improving. The patient is experiencing no pain. There has been no fever. She is sexually active. There is no history of pyelonephritis. Associated symptoms include flank pain and a possible pregnancy. Pertinent negatives include no frequency, hesitancy or urgency. Treatments tried: Cipro, Diclofenac. The treatment provided mild relief.        Past Medical History :  Active Ambulatory Problems     Diagnosis Date Noted   • GERD (gastroesophageal reflux disease)    • Anemia affecting pregnancy    • Encounter for initial prescription of contraceptive pills 11/01/2019   • Recurrent headache 07/01/2020   • Essential hypertension 07/01/2020   • Anxiety 07/01/2020   • Situational mixed anxiety and depressive disorder 07/02/2020   • Morbidly obese (HCC) 03/11/2021   • COVID-19 virus infection 11/30/2021   • Recurrent UTI 07/29/2022     Resolved Ambulatory Problems     Diagnosis Date Noted   • Post partum depression 11/01/2019     Past Medical  "History:   Diagnosis Date   • History of chicken pox        Medication List:    Current Outpatient Medications:   •  dicyclomine (Bentyl) 10 MG capsule, Take 1 capsule by mouth 4 (Four) Times a Day Before Meals & at Bedtime. Take if needed for abdominal pain and diarrhea., Disp: 20 capsule, Rfl: 0  •  hydrocortisone 2.5 % cream, Apply  topically to the appropriate area as directed 3 (Three) Times a Day., Disp: 30 g, Rfl: 2  •  Loratadine (CLARITIN PO), Take 1 tablet by mouth As Needed., Disp: , Rfl:   •  omeprazole (priLOSEC) 20 MG capsule, Take 1 capsule by mouth Daily., Disp: 30 capsule, Rfl: 5  •  sucralfate (Carafate) 1 g tablet, Take 1 tablet by mouth 4 (Four) Times a Day. Take 30 minutes before eating and at bedtime., Disp: 120 tablet, Rfl: 0    Allergies   Allergen Reactions   • Diclofenac Nausea And Vomiting       Social History     Tobacco Use   • Smoking status: Never Smoker   • Smokeless tobacco: Never Used   Substance Use Topics   • Alcohol use: Not on file       Review of Systems   Constitutional: Negative for fever.   Genitourinary: Positive for flank pain. Negative for frequency, hesitancy and urgency.   Musculoskeletal: Positive for back pain.   Neurological: Positive for tingling (bilateral arms) and numbness (bilateral arms). Negative for paresthesias.         Objective   Vitals:    07/13/22 1429   BP: 120/64   BP Location: Right arm   Patient Position: Sitting   Cuff Size: Large Adult   Pulse: 94   Resp: 16   Temp: 97.5 °F (36.4 °C)   TempSrc: Skin   SpO2: 99%   Weight: 101 kg (222 lb 3.2 oz)   Height: 167.6 cm (66\")     Body mass index is 35.86 kg/m².    Physical Exam  Constitutional:       Appearance: She is well-developed. She is obese. She is not ill-appearing.   HENT:      Head: Normocephalic and atraumatic.   Eyes:      General: No scleral icterus.     Conjunctiva/sclera: Conjunctivae normal.   Cardiovascular:      Rate and Rhythm: Normal rate and regular rhythm.      Heart sounds: Normal " heart sounds.   Pulmonary:      Effort: Pulmonary effort is normal.      Breath sounds: Normal breath sounds.   Abdominal:      General: Bowel sounds are normal. There is no distension.      Palpations: Abdomen is soft.      Tenderness: There is abdominal tenderness. There is no guarding or rebound.   Musculoskeletal:         General: Tenderness present. No signs of injury.      Cervical back: Normal range of motion and neck supple. No edema.      Right lower leg: No edema.      Left lower leg: No edema.   Skin:     General: Skin is warm.      Capillary Refill: Capillary refill takes less than 2 seconds.      Findings: No rash.   Neurological:      Mental Status: She is alert.      Cranial Nerves: No cranial nerve deficit.   Psychiatric:         Behavior: Behavior normal.         Thought Content: Thought content normal.         Judgment: Judgment normal.         XR Abdomen KUB    Result Date: 6/10/2022  Negative KUB. No radiopaque urinary tract stone is identified.  Electronically Signed By-Cira Gaitan MD On:6/10/2022 3:56 PM This report was finalized on [id number] by  Cira Gaitan MD.      Brief Urine Lab Results  (Last result in the past 365 days)      Color   Clarity   Blood   Leuk Est   Nitrite   Protein   CREAT   Urine HCG        07/13/22 1452               Negative       07/13/22 1452 Yellow   Cloudy   1+   Large (3+)   Negative   1+                     Assessment & Plan     Diagnoses and all orders for this visit:    1. Acute left-sided low back pain without sciatica (Primary)  -     XR Spine Lumbar 4+ View  -     methylPREDNISolone (MEDROL) 4 MG dose pack; Take as directed on package instructions.  Dispense: 21 tablet; Refill: 0  -     methocarbamol (ROBAXIN) 750 MG tablet; Take 1 tablet by mouth 4 (Four) Times a Day.  Dispense: 28 tablet; Refill: 1  -     ibuprofen (ADVIL,MOTRIN) 800 MG tablet; Take 1 tablet by mouth Every 8 (Eight) Hours As Needed for Moderate Pain .  Dispense: 30 tablet; Refill:  1  -     Urine Culture - Urine, Urine, Random Void    2. Recurrent UTI  -     POCT urinalysis dipstick, manual  -     POCT pregnancy, urine  -     Urine Culture - Urine, Urine, Random Void    3. Obesity, Class II, BMI 35-39.9    Recurrent left flank pain.  Last visit 6/10/22 and treated for UTI.  Symptoms returned - she went to ER for treatment, urine culture 7/9/22 was negative.  Flank pain symptoms continue.  Patient is not having any significant urinary symptoms at this time.      Higher suspicion for musculoskeletal etiology of symptoms than urinary given lack of urinary symptoms and negative KUB.  Lumbar imaging ordered (patient opted not to have done).  Await culture and treat if indicated.  She can let me know next week if not improving and we'll make plans for a CT abd/pel.    Medication and medication adverse effects discussed.  Drug education given and explained to patient. Patient verbalized understanding.  All questions presented by patient addressed.    No follow-ups on file.       Patient was given instructions and counseling regarding his/her condition or for health maintenance advice. Please see specific information pulled into the AVS if appropriate.     I wore protective equipment throughout this patient encounter to include mask. Hand hygiene was performed before donning protective equipment and after removal when leaving the room.

## 2022-07-15 LAB
BACTERIA UR CULT: NORMAL
BACTERIA UR CULT: NORMAL

## 2022-07-17 ENCOUNTER — PATIENT MESSAGE (OUTPATIENT)
Dept: FAMILY MEDICINE CLINIC | Facility: CLINIC | Age: 27
End: 2022-07-17

## 2022-07-17 DIAGNOSIS — M54.50 ACUTE LEFT-SIDED LOW BACK PAIN WITHOUT SCIATICA: Primary | ICD-10-CM

## 2022-07-17 DIAGNOSIS — N39.0 RECURRENT UTI: ICD-10-CM

## 2022-07-17 DIAGNOSIS — R31.0 GROSS HEMATURIA: ICD-10-CM

## 2022-07-18 ENCOUNTER — TELEPHONE (OUTPATIENT)
Dept: FAMILY MEDICINE CLINIC | Facility: CLINIC | Age: 27
End: 2022-07-18

## 2022-07-18 NOTE — TELEPHONE ENCOUNTER
Called patient due to her not keeping her STAT CT appt with [facility] today on 07/18/2022. I did explain to patient that if she was unable to keep her STAT CT appt today that it would no longer be consider STAT after today and that it would limit the amount of treatment time she will have with Dr. Salas due to Dr. Salas leaving the practice on 07/25/2022.

## 2022-07-18 NOTE — TELEPHONE ENCOUNTER
Barb Puckett 7/18/2022 8:48 AM EDT      ----- Message -----  From: Louise Lopez  Sent: 7/17/2022 9:10 PM EDT  To: Ronnie Baker  Clinical Pool  Subject: Following up with the pain in my back.     I'm still having severe pain in my back. I've taken the meds like ordered, still taking meds, my pain would ease up and then come back after meds wore off. It hurts to move, I can barely bend over, and twist. My left are has felt pain in it throughout the weekend bit no chest pains.

## 2022-07-27 ENCOUNTER — CLINICAL SUPPORT (OUTPATIENT)
Dept: FAMILY MEDICINE CLINIC | Facility: CLINIC | Age: 27
End: 2022-07-27

## 2022-07-27 DIAGNOSIS — Z02.1 PRE-EMPLOYMENT DRUG SCREENING: Primary | ICD-10-CM

## 2022-07-29 ENCOUNTER — TELEPHONE (OUTPATIENT)
Dept: FAMILY MEDICINE CLINIC | Facility: CLINIC | Age: 27
End: 2022-07-29

## 2022-07-29 PROBLEM — N39.0 RECURRENT UTI: Status: ACTIVE | Noted: 2022-07-29

## 2022-07-29 NOTE — TELEPHONE ENCOUNTER
----- Message from Shauna Ram MA sent at 7/29/2022 11:06 AM EDT -----  Regarding: RE: CT  Patient said that she is scheduled for Tuesday of next week.     ----- Message -----  From: Inocencia Salas MD  Sent: 7/29/2022  11:04 AM EDT  To: Shauna Ram MA  Subject: CT                                               Will you f/u with patient regarding the CT scan that was ordered.  Did she get this done?